# Patient Record
Sex: MALE | Race: WHITE | Employment: UNEMPLOYED | URBAN - METROPOLITAN AREA
[De-identification: names, ages, dates, MRNs, and addresses within clinical notes are randomized per-mention and may not be internally consistent; named-entity substitution may affect disease eponyms.]

---

## 2018-01-08 ENCOUNTER — HOSPITAL ENCOUNTER (OUTPATIENT)
Dept: BEHAVIORAL HEALTH | Facility: CLINIC | Age: 38
Discharge: HOME | End: 2018-01-08
Attending: SOCIAL WORKER | Admitting: SOCIAL WORKER
Payer: COMMERCIAL

## 2018-01-08 VITALS
WEIGHT: 242 LBS | HEIGHT: 72 IN | DIASTOLIC BLOOD PRESSURE: 95 MMHG | SYSTOLIC BLOOD PRESSURE: 137 MMHG | HEART RATE: 95 BPM | BODY MASS INDEX: 32.78 KG/M2

## 2018-01-08 PROCEDURE — H0001 ALCOHOL AND/OR DRUG ASSESS: HCPCS

## 2018-01-08 ASSESSMENT — ANXIETY QUESTIONNAIRES
4. TROUBLE RELAXING: MORE THAN HALF THE DAYS
3. WORRYING TOO MUCH ABOUT DIFFERENT THINGS: MORE THAN HALF THE DAYS
5. BEING SO RESTLESS THAT IT IS HARD TO SIT STILL: SEVERAL DAYS
1. FEELING NERVOUS, ANXIOUS, OR ON EDGE: MORE THAN HALF THE DAYS
GAD7 TOTAL SCORE: 11
6. BECOMING EASILY ANNOYED OR IRRITABLE: SEVERAL DAYS
7. FEELING AFRAID AS IF SOMETHING AWFUL MIGHT HAPPEN: SEVERAL DAYS
2. NOT BEING ABLE TO STOP OR CONTROL WORRYING: MORE THAN HALF THE DAYS

## 2018-01-08 ASSESSMENT — PAIN SCALES - GENERAL: PAINLEVEL: MILD PAIN (2)

## 2018-01-08 ASSESSMENT — PATIENT HEALTH QUESTIONNAIRE - PHQ9: SUM OF ALL RESPONSES TO PHQ QUESTIONS 1-9: 17

## 2018-01-08 NOTE — PROGRESS NOTES
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 make changes? What does the person feel would be helpful? What has the person been able to do without help?)      Pt appears to be totally self-motivated for sobriety and tx at this time, and denies an external motivating factors. He rates his motivation for change as 10 on a 10 point scale.        DIMENSION V - Relapse, Continued Use and Continued Problem Potential     1. If you have had previous periods of sobriety, when was your longest period of sobriety and what were you doing at that time that was supporting your sobriety?  (DSM-5: Sx #2)     -  He just quit cold turkey.     2. Within the past 30 days, on a scale from 0-10 (0 = having no cravings at all and 10 = having very strong cravings to use alcohol or other drugs) what number would you assign to your cravings? (DSM-5: Sx #4)     7    3. Can you identify any specific reasons or specific triggers that contribute to you being more likely to consume alcohol or other drugs? (DSM-5: Sx #4)    Yes, explain: He doesn't have energy,     4. Have you been treated for alcohol/other substance use disorder? (DSM-5: Sx #2)    Yes     Vicky Walker, he didn't like it, he just ready  to quit. He used immediately.    5. Support group participation: Have you/do you attend 12-step or other support group meetings? How recently? What was your experience? Are you willing to restart? If the person has not participated, is he or she willing?  (DSM-5: Sx #2)    The patient reported a remote history of attending 12-step or other support group meetings on a regular basis, but denied having any recent attendance at meetings. Last use was about a year ago.     6. Do you drink alcohol or use other drugs in larger amounts than intended or over a longer period of time than was intended?  (DSM-5: Sx #1)    Yes, explain: he said he was aware it was a problem in the past an quit form  - . After starting again, he realized it was a problem last year but just not able to quit.     7. Do you spend a great deal of time engaged in activities necessary to obtain alcohol or other drugs, a great deal of time using alcohol or other drugs, or a great deal of time recovering from alcohol or other drug use?  (DSM-5: Sx #3)    Yes, explain: he snorts 5 lines which affect him all day long,     Dimension V Ratings Summary   Relapse/Continued Use/Continued problem potential - The placing authority must use the criteria in Dimension V to determine a client s relapse, continued use, and continued problem potential.   RISK DESCRIPTIONS - Severity ratin No awareness of the negative impact of mental health problems or substance abuse. No coping skills to arrest mental health or addiction illnesses, or prevent relapse.    REASONS SEVERITY WAS ASSIGNED - (What information did the person provide that indicates his or her understanding of relapse issues? What about the person s experience indicates how prone he or she is to relapse? What coping skills does the person have that decrease relapse potential?)      Pt has been a heavy daily user of heroin for several years and unable to quit in spite of negative consequences in  several life areas. He has only had one CD tx and lacks relapse prevention recovery management skills. He has been an almost daily user for a long time and has been habituated to using so is at high risk of relapse. The sudden death of his fiance and best friend have been a shocking to him and increase his risk of relapse. He rates his cravings of 7 on a 10 point scale.        DIMENSION VI - Recovery Environment     1. Are you employed or attending school?    Neither. 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     2. If working or a student, are you able to function appropriately in that setting?     Yes    3. Has your job and/or school work been negatively impacted by your use of alcohol of other drugs?  (DSM-5: Sx #5 & Sx #7)    No    4. How would you describe your current finances?  Doing okay     5. Are you having financial problems, such as money being tight, living paycheck to paycheck, having unpaid or late bills, having significant debt, a history of bankruptcy, or IRS problems?    No     6. Describe a typical day; evening for you. Work, school, social, leisure activities, volunteer, exercise, spiritual practices or other daily tasks.    His pattern for last 3 years was to get up at 6 am use 1/2 oz left from the day before as he typically bought an oz a day.  He would work 8 - 7 pm 7 days a week, many of his customers would stop by to purchase drugs in the AM. After work he would meet with dealer, who might stop by at work, to get supply for next day.     7. Have you reduced or discontinued recreational activities, hobbies or other leisure activities as a result of your use of alcohol or other drugs?  (DSM-5: Sx #7)    No  He still occasionally plays golf.    8. Who do you live with?      He had been living with his fiance and BF. He is temporarily staying with his mother.     9.  Are there any people in the home who have current substance abuse issues or have mental health issues?     No  Mom doesn't use. His fiance and BF were also addicted to heroin.     10. Tell me about your living environment/neighborhood? Ask enough follow up questions to determine safety, criminal activity, availability of alcohol and drugs, supportive or antagonistic to the person making changes.      Safe, drugs not readily available. Drug dealer would meet him anywhere.     11. Are you concerned for your safety or anyone else's safety in the home? No    12. Do you have plans to move somewhere else or change your living environment in any manner?    He has no idea where he will move after tx.     13. Do you have children who live with you?     No    14. Do you have children who do not live with you?     No    15. Do you have any history of being involved with Child Protection Services? No     16. Are you currently in a significant relationship?     He was but his fiance of 7 years  yesterday.     17. How do you identify your sexual orientation?    Heterosexual    18. The patient reported he denies any mental health hospitalizations.    19. Does your significant other have a history of substance abuse or have current substance abuse issues?    Yes, If yes explain: His fiance and best friend were also daily heroin users.      20. How important is substance use to your social connections? Do many of your family or friends use?     90 % of his using was done alone.     21. Who in your life would you consider to be your primary support network at this time?    Family, parents as well as step mom and step dad.     22. Have any of your relationships (S.O., family members, friends, employers, teachers, etc.) been negatively impacted by your use of alcohol or other drugs?  (DSM-5: Sx #6)    Yes, If yes explain: They have been concerned about it for 7 years. He isolates from them, cut everyone off.     23. Do you  currently participate in community randell activities, such as attending Voodoo, temple, Jain or Baptist services?  No    24. Criminal justice history: Gather current/recent history and any significant history related to substance use--Arrests? Convictions? Circumstances? Alcohol or drug involvement? Sentences? Still on probation or parole? Expectations of the court? Current court order?  (DSM-5: Sx #8)    None  Possibly coming, Police want to talk to him about his fiance's and BF's OD.    25. Are you or have you ever been a registered sex offender? No    26. Do you have a child protection worker,  or ? No    27. Do you have a valid 's license?  Yes    28. What obstacles exist to participating in treatment? (Time off work, childcare, funding, transportation, pending [facility] time, living situation)     None, he wants to get in ASAP.    Dimension VI Ratings Summary   Recovery environment - The placing authority must use the criteria in Dimension VI to determine a client s recovery environment.   RISK DESCRIPTIONS - Severity rating: 3 Client is not engaged in structured, meaningful activity and the client s peers, family, significant other, and living environment are unsupportive, or there is significant criminal justice system involvement.    REASONS SEVERITY WAS ASSIGNED - (What support does the person have for making changes? What structure/stability does the person have in his or her daily life that will increase the likelihood that changes can be sustained? What problems exist in the person s environment that will jeopardize getting/staying clean and sober?)     Pt is currently living with his mother who is very supportive. He is not allowed to go back to his apartment until the police complete the investigation of his fiance and friend's death. A lot of his associates are drug users, dealers and customers. His work is a place of using in that a lot of his customers and dealer  meet him there. He has very little chemically free carlos activities out side of work. He has not engaged in any Alevism or sober support groups. 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 He is not working now, so has a lot of unstructured free time which can be a set up for using.         Mental Health Status   Physical Appearance/Attire: Appears stated age   Hygiene: Comment: fair grooming   Eye Contact: at examiner   Speech Rate:  regular   Speech Volume: regular   Speech Quality: fluid   Cognitive/Perceptual:  reality based   Cognition: memory intact    Judgment: intact   Insight: intact   Orientation:  time, place, person and situation   Thought:   logical    Hallucinations:  none   General Behavioral Tone: cooperative   Psychomotor Activity: no problem noted   Gait:  no problem   Mood: appropriate, sad and depressed   Affect: flat/none and blunted/restricted   Counselor Notes: NA     Patient Choices/Exceptions     Would you like services specific to language, age, gender, culture, Confucianism preference, race, ethnicity, sexual orientation or disability?  No    What particular treatment choices and options would you like to have? Residential tx with the first opening he does not want a MAT program.     Do you have a preference for a particular treatment program? Residential tx with first opening he does not want a MA^T program.     Patient is willing to follow treatment recommendations.  Yes    DSM-5 Criteria for Substance Use Disorder   Criteria for Diagnosis  Instructions: Determine whether the client currently meets the criteria for Substance Use Disorder using the diagnostic criteria in the DSM-5 pp.[id number]. Current means during the most recent 12 months outside a facility that controls access to substances.    A problematic pattern of alcohol/drug use leading to clinically significant impairment or distress, as manifested by at least two of the following, occurring within a 12-month period:    1.  Alcohol/drug is often taken in larger amounts or over a longer period than was intended.  2. There is a persistent desire or unsuccessful efforts to cut down or control alcohol/drug use  3. A great deal of time is spent in activities necessary to obtain alcohol/drug, use alcohol/drug, or recover from its effects.  4. Craving, or a strong desire or urge to use alcohol/drug  5. Recurrent alcohol/drug use resulting in a failure to fulfill major role obligations at work, school or home.  6. Continued alcohol use despite having persistent or recurrent social or interpersonal problems caused or exacerbated by the effects of alcohol/drug.  7. Important social, occupational, or recreational activities are given up or reduced because of alcohol/drug use.  8. Recurrent alcohol/drug use in situations in which it is physically hazardous.  9. Alcohol/drug use is continued despite knowledge of having a persistent or recurrent physical or psychological problem that is likely to have been caused or exacerbated by alcohol.  10. Tolerance, as defined by either of the following: A need for markedly increased amounts of alcohol/drug to achieve intoxication or desired effect.  11. Withdrawal, as manifested by either of the following: The characteristic withdrawal syndrome for alcohol/drug (refer to Criteria A and B of the criteria set for alcohol/drug withdrawal).          Specify if: In early remission:  After full criteria for alcohol/drug use disorder were previously met, none of the criteria for alcohol/drug use disorder have been met for at least 3 months but for less than 12 months (with the exception that Criterion A4,  Craving or a strong desire or urge to use alcohol/drug  may be met).     In sustained remission:   After full criteria for alcohol use disorder were previously met, non of the criteria for alcohol/drug use disorder have been met at any time during a period of 12 months or longer (with the exception that Criterion  A4,  Craving or strong desire or urge to use alcohol/drug  may be met).   Specify if:   This additional specifier is used if the individual is in an environment where access to alcohol is restricted.    Mild: Presence of 2-3 symptoms  Moderate: Presence of 4-5 symptoms  Severe: Presence of 6 or more symptoms    DSM-5 Substance Use Disorder Diagnosis     Alcohol Use Disorder Moderate - 303.90 (F10.20) in full remission  Opioid Use Disorder Severe - 304.00 (F11.20)  Cocaine Use Disorder Mild - 305.60 (F14.10) in full remission  Tobacco Use Disorder Moderate - 305.10 (F17.200)    Collateral Contact Summary     Number of contacts made:  1    Contact with referring person:  Yes, If yes explain: self    If court related records were reviewed, summarize here:  No    Information from collateral contacts supported/largely agreed with information from the client and associated risk ratings.    Collateral Contact      Name: Relationship: Phone number: Releases:   Julienne Reveles mother [phone] Yes     His mother said she has been concerned for a long time. He went to [facility] in 2012. She said she doesn't know much about his use because for the last several years he has isolated and withdrawn from his family.        Recommendations     1) Abstain from alcohol and all illicit drugs.  2) Enter a residential CD program and follow counselor recommendations. He is looking at [facility].   3) He could benefit from a mental health assessment.   4) Arrange for a step down level of programing after intensive residential tx.   5) Arrange for sober living after tx.   6) Patient will need to work grief and loss issues regarding death of fiance and best friend.  7) Patient may need to look at a different job as his previous job was intertwined with his using customers and dealer.     Level of Care   I.) Intoxication and Withdrawal: 1   II.) Biomedical:  1   III.) Emotional and Behavioral:  2   IV.) Readiness to Change:   0   V.) Relapse Potential: 4   VI.) Recovery Environmental: 3     Initial Problem List     The patient lacks relapse prevention skills  The patient has poor coping skills  The patient has poor refusal skills   The patient lacks a sober peer support network  The patient has a tendency to isolate  The patient lacks the ability to effectively manage his/her mental health issues  The patient has a significant history of grief and loss issues  The patient is still in shock from the recent death of his fiance and best friend.
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
## 2018-01-09 ASSESSMENT — ANXIETY QUESTIONNAIRES: GAD7 TOTAL SCORE: 11
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
## 2018-01-12 ENCOUNTER — HOSPITAL ENCOUNTER (OUTPATIENT)
Dept: BEHAVIORAL HEALTH | Facility: CLINIC | Age: 38
End: 2018-01-12
Attending: FAMILY MEDICINE
Payer: COMMERCIAL

## 2018-01-12 VITALS — TEMPERATURE: 97.7 F | SYSTOLIC BLOOD PRESSURE: 157 MMHG | HEART RATE: 104 BPM | DIASTOLIC BLOOD PRESSURE: 96 MMHG

## 2018-01-12 PROBLEM — F19.20 CHEMICAL DEPENDENCY (H): Status: ACTIVE | Noted: 2018-01-12

## 2018-01-12 PROCEDURE — H2035 A/D TX PROGRAM, PER HOUR: HCPCS | Mod: HQ

## 2018-01-12 PROCEDURE — 10020000 ZZH LODGING PLUS FACILITY CHARGE ADULT

## 2018-01-12 RX ORDER — LORATADINE 10 MG/1
10 TABLET ORAL DAILY PRN
COMMUNITY
End: 2018-02-03

## 2018-01-12 RX ORDER — AMOXICILLIN 250 MG
2 CAPSULE ORAL DAILY PRN
COMMUNITY
End: 2018-02-03

## 2018-01-12 RX ORDER — MAGNESIUM HYDROXIDE/ALUMINUM HYDROXICE/SIMETHICONE 120; 1200; 1200 MG/30ML; MG/30ML; MG/30ML
30 SUSPENSION ORAL EVERY 6 HOURS PRN
COMMUNITY
End: 2018-02-03

## 2018-01-12 ASSESSMENT — ANXIETY QUESTIONNAIRES
2. NOT BEING ABLE TO STOP OR CONTROL WORRYING: MORE THAN HALF THE DAYS
6. BECOMING EASILY ANNOYED OR IRRITABLE: SEVERAL DAYS
GAD7 TOTAL SCORE: 11
5. BEING SO RESTLESS THAT IT IS HARD TO SIT STILL: SEVERAL DAYS
4. TROUBLE RELAXING: MORE THAN HALF THE DAYS
3. WORRYING TOO MUCH ABOUT DIFFERENT THINGS: MORE THAN HALF THE DAYS
7. FEELING AFRAID AS IF SOMETHING AWFUL MIGHT HAPPEN: SEVERAL DAYS
1. FEELING NERVOUS, ANXIOUS, OR ON EDGE: MORE THAN HALF THE DAYS

## 2018-01-12 ASSESSMENT — PATIENT HEALTH QUESTIONNAIRE - PHQ9: SUM OF ALL RESPONSES TO PHQ QUESTIONS 1-9: 11

## 2018-01-12 NOTE — PROGRESS NOTES
"  Progress Note       This patient was seen for a face to face update of his comprehensive assessment on 1/12/2018 by MAG Pelaez:  Yes    This patient had a comprehensive assessment with MAG Ch on Monday 1/8/2018.  An update of his comprehensive assessment was completed today by MAG Pelaez on Friday 1/12/2018.  The only significant clinical change since his original comprehensive assessment on 1/8/2018 was he had 1 additional use of heroin on Wednesday 1/10/2018 when he reported snorting \"1 point\" of heroin at 4pm.  The patient participated in a UA drug screen on the day of the CD evaluation on 1/12/2018 using the [facility] 12 panel cup.  The UA drug screen came back positive for Morphine/Heroin and Oxycodone and negative for the rest of the 12 drugs on the screening panel, including Cocaine, THC, Amphetamines, Methamphetamine, PCP, Benzodiazepines, Barbiturates, Methadone, MDMA and Buprenorphine.     Date of update: Update Evaluation Counselor:   1/12/2018     MAG Pelaez     Date of original CD evaluation: Original Evaluation Counselor:   1/8/2018     MAG Ch       PHQ-9, VINCENT-7   PHQ-9 on @TD@ VINCENT-7 on @TD@   The patient's PHQ-9 score was 11 out of 27, indicating moderate depression.     The patient's VINCENT-7 score was 11 out of 21, indicating moderate anxiety.         Suicide Screening Questions:   1. Are you feeling hopeless about the present/future?    No   2. Have you ever had thoughts about taking your life?    No   3. When did you have these thoughts?    NA   4. Do you have any current intent or active desire to take your life?    No   5. Do you have a plan to take your life?    No   6. Have you ever made a suicide attempt?    No   7. Do you have access to pills, guns or other methods to kill yourself?    No             Guide to Risk Ratings   IDEATION: Active thoughts of suicide? INTENT: Intent to follow on suicide? PLAN: Plan to follow through on " "suicide? Level of Risk:   IF Yes Yes Yes Patient = High Emergent   IF Yes Yes No Patient = High Urgent/Non-Emergent   IF Yes No No Patient = Moderate Non-Urgent   IF No No No Patient = Low Risk   The patient's ADDITIONAL RISK FACTORS and lack of PROTECTIVE FACTORS may increase their overall suicide risk ratings.      Patient's Responses (within the last 30 days)   IDEATION: Active thoughts of suicide?     No    INTENT: Intent to follow on suicide?     No    PLAN: Plan to follow through on suicide?     No    Determining the level of risk depends on the patient responses, suicide risk factors and protective factors.      Additional Risk Factors: A recent death of someone close to the patient and/or unresolved grief and loss issues  A recent loss that was significant to the patient, i.e. loss of job, loss of home, divorce, break-up, etc.  Significant history of trauma and/or abuse issues  Fiance of 7 years and best friend of 10 years  last week of questionable causes I.e. Heroin OD or accidental carbon monoxide poisoning from a defective car muffler.    Protective Factors:  Having people in his/her life that would prevent the patient from considering committing suicide (i.e. young children, spouse, parents, etc.)  Having easy access to supportive family members          Risk Status   Emergent? No   Urgent / Non-Emergent? No   Present / Non- Urgent? Yes, Document in Epic / SBAR to counselor, Collaborate with patient / client to develop \"Patient Safety Plan\", Referral to PCP or psychiatrist, Address in Treatment Plan, Continuous monitoring, assessment and intervention and Address in Discharge / Transition Plan    Low Risk? See above   Additional information to support suicide risk rating: See Above     Current TAMIKO: Current UA:     .000       Positive for Morphine/Heroin and Oxycodone  and negative for all other screened drugs.         Alcohol/Drug use since the last CD evaluation (include date and time of last use): " "    He had 1 use of heroin since he met with [facility] when he reported snorting \"1 point\" of heroin at 4pm on Tuesday 1/10/2018.       Please note any other clinical changes since the last CD evaluation (such as medication changes, additional legal charges, detoxification admissions, overdoses, etc.)     No significant changes since the last CD evaluation         Current ASAM Dimensions   I.) Intoxication and Withdrawal: 1   II.) Biomedical:  1   III.) Emotional and Behavioral:  2   IV.) Readiness to Change:  0   V.) Relapse Potential: 4   VI.) Recovery Environmental: 3         "
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
## 2018-01-12 NOTE — PROGRESS NOTES
[facility] Health Assessment      Vital signs:     BP (!) 157/96  Pulse 104  Temp 97.7  F (36.5  C)      Direct admission    Counselor: Aron  Drug of Choice: Heroin  Last use: 1/10/2018  Home clinic/MD: Dr. Mary Taylor / [facility]  Psychiatrist/therapist: none    Medical history/current conditions:  none    H&P Screen:  H&P within the last 90 days: No.  Patient has been informed they are required to obtain an H&P within the next 14 days.        Mental Health diagnosis: none  Medication compliant?: na  Recent sucidal thoughts? no     When? na  Current thought of self-harm? no    Plan? na    Pain assessment:   Pt. Experiencing pain at this time?  No      Nursing Assessment Summary:  no    On-going nursing intervention required?   No    Acute care visit recommended: no

## 2018-01-12 NOTE — PROGRESS NOTES
Initial Services Plan        Before your first treatment group, please do the following    Immediate health & safety concerns: Obtain personal items (glasses, hearing aides, medicines, diabetes supplies, clothing, etc.).  Look for a support network (such as AA, NA, DBT group, a Christianity group, etc.)    Suggestions for client during the time between intake & completion of treatment plan:  Tour your treatment center (unit or outpatient clinic).  Introduce yourself to the treatment group.  Spend time getting to know your peers.  Complete the problem list for your treatment plan.  Start drug and alcohol use history.  Review your patient or client handbook.  Identify concerns about whom to ask for family week    Client issues to be addressed in the first treatment sessions:  Identify concerns about coming into treatment, i.e. fear of failing again, sharing a room in treatment  Other His fiancee and his best friend both  from a probable heroin overdose last week, so it is still very fresh with him and he would like to focus on addressing his grief and loss issues.      MAG Pelaez  2018  8:48 AM

## 2018-01-12 NOTE — PROGRESS NOTES
"Patient Safety Plan Template    Name:   Christiano Urñea  YOB: 1980 Age:  37 MR Number:  [id number]   Step 1: Warning signs (Thoughts, images, mood, situation, behavior) that a crisis may be developin. Isolate      2. Patient did not answer     3. Patient did not answer   Step 2: Internal coping strategies - Things I can do to take my mind off of my problems without contacting another person (relaxation technique, physical activity):     1.  Reading   2.   Music    3. Meditation      Step 3: People and social settings that provide distraction:     1. Name:  RIKY Ureña    Phone:   [phone]   2. Name:  Julienne Reveles  Phone:   [phone]   3. Place: [facility] 4. Place: [phone]     The one thing that is most important to me and worth living for is: \"My family\"      Step 4: People whom I can ask for help:     1. Name: RIKY Ureña  Phone: [phone]     2. Name:   Julienne Reveles  Phone:   [phone]   3. Name: Jefferson Villalba Phone: [phone]   Step 5: Professionals or agencies I can contact during a crisis:     1. Clinician Name: Patient did not answer Phone: Patient did not answe   Clinician Pager or Emergency Contact #:     2. Clinician Name: Patient did not answer Phone:   Patient did not answer   Clinician Pager or Emergency Contact #:      3. Local Urgent Care Services:     Urgent Care Services Address:     Urgent Care Services Phone:      4. Suicide Prevention Lifeline Phone: [phone] ([phone])     Step 6: Making the environment safe:     1. Stay in contact with family/structure in schedule     2. No drugs in house     Safety Plan Template 2008 Beth Owens and Hector Chandler is reprinted with the express permission of the authors.  No portion of the Safety Plan Template may be reproduced without the express, written permission.  You can contact the authors at bhs@[place].[facility] or [email or web address].                   "

## 2018-01-13 ENCOUNTER — HOSPITAL ENCOUNTER (OUTPATIENT)
Dept: BEHAVIORAL HEALTH | Facility: CLINIC | Age: 38
End: 2018-01-13
Attending: FAMILY MEDICINE
Payer: COMMERCIAL

## 2018-01-13 PROCEDURE — H2035 A/D TX PROGRAM, PER HOUR: HCPCS | Mod: HQ

## 2018-01-13 PROCEDURE — 10020000 ZZH LODGING PLUS FACILITY CHARGE ADULT

## 2018-01-13 RX ORDER — MULTIPLE VITAMINS W/ MINERALS TAB 9MG-400MCG
1 TAB ORAL DAILY
COMMUNITY
End: 2018-02-03

## 2018-01-13 ASSESSMENT — ANXIETY QUESTIONNAIRES: GAD7 TOTAL SCORE: 11

## 2018-01-13 NOTE — PROGRESS NOTES
Acknowledgement of Current Treatment Plan - Initial Treatment Plan     INITIAL TREATMENT PLAN:     1. I have participated in creating my treatment plan with my therapist / counselor on __1/13/18________.     I agree with the plan as it is written in the electronic health record.    Name Signature/Date   Christiano Ureña     Name of Therapist / Counselor Signature/Date   SEBAS [facility]      2. I have completed and reviewed my Safety Plan with my counselor and signed this on __1/13/18_______. I have been given the hard copy of this plan.    Patient signature/date:      ________________________________________________________________    3. Last Use Date: ___1/8/18_______    Patient signature/date:     ________________________________________________________________                                                    Acknowledgement of Current Treatment Plan - Additional Entries       ADDITIONAL GOALS AND INTERVENTIONS:    Signatures/dates required for any additional Problems, Goals, and/or Interventions added to treatment plan:  Change/Addition in Dimension ____ on date:_________  Insert here:         I have participated in creating this change and/or addition to my treatment plan copied above.   I have been given a copy of this signature page with change/addition to my treatment plan and I am in agreement with how it is written in the electronic record.       Patient signature:   ________________________________________________________________________    I have been given a copy of the addition to my treatment plan in Dimension _____ on _________ and I agree with this as it is written in the electronic record.      Patient signature:   ________________________________________________________________      Last use date if changed: ________________________________________________________________

## 2018-01-14 ENCOUNTER — HOSPITAL ENCOUNTER (OUTPATIENT)
Dept: BEHAVIORAL HEALTH | Facility: CLINIC | Age: 38
End: 2018-01-14
Attending: FAMILY MEDICINE
Payer: COMMERCIAL

## 2018-01-14 PROCEDURE — H2035 A/D TX PROGRAM, PER HOUR: HCPCS | Mod: HQ

## 2018-01-14 PROCEDURE — 10020000 ZZH LODGING PLUS FACILITY CHARGE ADULT

## 2018-01-15 ENCOUNTER — HOSPITAL ENCOUNTER (OUTPATIENT)
Dept: BEHAVIORAL HEALTH | Facility: CLINIC | Age: 38
End: 2018-01-15
Attending: FAMILY MEDICINE
Payer: COMMERCIAL

## 2018-01-15 ENCOUNTER — TELEPHONE (OUTPATIENT)
Dept: BEHAVIORAL HEALTH | Facility: CLINIC | Age: 38
End: 2018-01-15

## 2018-01-15 ENCOUNTER — OFFICE VISIT (OUTPATIENT)
Dept: BEHAVIORAL HEALTH | Facility: CLINIC | Age: 38
End: 2018-01-15
Payer: COMMERCIAL

## 2018-01-15 VITALS
OXYGEN SATURATION: 97 % | RESPIRATION RATE: 16 BRPM | DIASTOLIC BLOOD PRESSURE: 82 MMHG | SYSTOLIC BLOOD PRESSURE: 130 MMHG | WEIGHT: 253 LBS | TEMPERATURE: 98.5 F | BODY MASS INDEX: 34.31 KG/M2 | HEART RATE: 102 BPM

## 2018-01-15 DIAGNOSIS — Z00.00 ROUTINE GENERAL MEDICAL EXAMINATION AT A HEALTH CARE FACILITY: ICD-10-CM

## 2018-01-15 DIAGNOSIS — G47.00 PERSISTENT DISORDER OF INITIATING OR MAINTAINING SLEEP: Primary | ICD-10-CM

## 2018-01-15 PROCEDURE — 10020000 ZZH LODGING PLUS FACILITY CHARGE ADULT

## 2018-01-15 PROCEDURE — H2035 A/D TX PROGRAM, PER HOUR: HCPCS | Mod: HQ

## 2018-01-15 PROCEDURE — 99203 OFFICE O/P NEW LOW 30 MIN: CPT | Performed by: NURSE PRACTITIONER

## 2018-01-15 NOTE — NURSING NOTE
Chief Complaint   Patient presents with     Lodging Plus       Initial /82  Pulse 102  Temp 98.5  F (36.9  C) (Oral)  Resp 16  Wt 253 lb (114.8 kg)  SpO2 97%  BMI 34.31 kg/m2 Estimated body mass index is 34.31 kg/(m^2) as calculated from the following:    Height as of 1/8/18: 6' (1.829 m).    Weight as of this encounter: 253 lb (114.8 kg).  Medication Reconciliation: complete

## 2018-01-15 NOTE — PROGRESS NOTES
CC: Patient is a 37 year old  male who presents for History and Physical for Lodging Plus.     Besides history and physical:  patient would like to discuss no issues.   Insomnia      Duration: chronic for years, hoping it gets better now that he has detoxed    Description  Frequency of insomnia:  nightly  Time to fall asleep: Depends, 15min- 1 hour  Middle of night awakening:  nightly  Early morning awakening:  nightly    Accompanying signs and symptoms:  none    History  Similar episodes in past:  YES- chronic for years  Previous evaluation/sleep study:  no     Precipitating or alleviating factors:  New stressful situation: YES- detoxed from Alcohol   Caffeine intake after lunchtime: no   OTC decongestants: no   Any new medications: no     Therapies tried and outcome: recommend sleep study, patient in agreement          Patient Active Problem List   Diagnosis     Chemical dependency (H)       No past medical history on file.    No past surgical history on file.    No family history on file.    Social History     Social History     Marital status: Single     Spouse name: N/A     Number of children: N/A     Years of education: N/A     Occupational History     Not on file.     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, follows with opthalmology   ENT: NEGATIVE for ear, mouth and throat problems  R: NEGATIVE for significant cough or SOB  CV: NEGATIVE for chest pain, palpitations or peripheral edema  GI: NEGATIVE for nausea, abdominal pain,  "heartburn, or change in bowel habits  M: NEGATIVE for significant arthralgias or myalgia  N: NEGATIVE for weakness, dizziness or paresthesias  P: NEGATIVE for changes in mood or affect, chronic sleep issues, wakes up frequently at night, every 30 minutes  : negative for dysuria, hematuria, urethral discharge      OBJECTIVE:  Temp: 98.5  F (36.9  C) Temp src: Oral BP: 130/82 Pulse: 102   Resp: 16 SpO2: 97 %          Exam:  Constitutional: healthy, alert and no distress  Head: Normocephalic. No masses, lesions, tenderness or abnormalities  Neck: Neck supple. No adenopathy. Thyroid symmetric, normal size,  ENT: ENT exam normal, no neck nodes or sinus tenderness  Cardiovascular: negative\",  RRR. No murmurs, clicks gallops or rub  Respiratory: negative\", Lungs clear  Gastrointestinal: Abdomen soft, non-tender. BS normal. No masses, organomegaly  Musculoskeletal: extremities normal- no gross deformities noted, gait normal and normal muscle tone  Skin: no suspicious lesions or rashes  Neurologic: Gait normal. Reflexes normal and symmetric. Sensation grossly WNL.  Psychiatric: mentation appears normal and affect normal/bright  Hematologic/Lymphatic/Immunologic: normal ant/post cervical and supraclavicular  nodes      ASSESSMENT/PLAN:  (G47.00) Persistent disorder of initiating or maintaining sleep  (primary encounter diagnosis)  Comment: Long standing problem for patient, never evaluated  Plan: SLEEP EVALUATION & MANAGEMENT REFERRAL - [facility]  [phone] (Age 2 and up)        Recommend sleep eval, referral placed, counselor will help make appt    (Z00.00) Routine general medical examination at a health care facility  Comment: no issues  Plan: ok for treatment program    Nica Mariscal, LANEY, APRN CNP    "

## 2018-01-15 NOTE — TELEPHONE ENCOUNTER
A phone call was made as follow-up to the Family Program mailing for the week of 1/29/18. Message left or spoke in person to individuals on LOUIE.

## 2018-01-15 NOTE — MR AVS SNAPSHOT
After Visit Summary   1/15/2018    Christiano Ureña    MRN: [id number]           Patient Information     Date Of Birth          1980        Visit Information        Provider Department      1/15/2018 10:30 AM Nica Mariscal APRN [facility]        Today's Diagnoses     Persistent disorder of initiating or maintaining sleep    -  1      Care Instructions    We have made a referral for a sleep study, please have your counselor make an appt for you          Follow-ups after your visit        Additional Services     SLEEP EVALUATION & MANAGEMENT REFERRAL - [facility]  [phone] (Age 2 and up)       Please be aware that coverage of these services is subject to the terms and limitations of your health insurance plan.  Call member services at your health plan with any benefit or coverage questions.      Please bring the following to your appointment:    >>   List of current medications   >>   This referral request   >>   Any documents/labs given to you for this referral                      Your next 10 appointments already scheduled     Jan 16, 2018  9:30 AM CST   Treatment with ADULT LODGING PLUS C   [facility] ([facility])    [address] [place] [address]   [phone]            Jan 17, 2018  9:30 AM CST   Treatment with ADULT LODGING PLUS C   [facility] ([facility])    [address] [place] [address]   [phone]            Jan 18, 2018  9:30 AM CST   Treatment with ADULT LODGING PLUS C   [facility] ([facility])    [address] [place] [address]   [phone]            Jan 19, 2018  9:30 AM CST   Treatment with ADULT LODGING PLUS C   [facility]    [facility]note 2[facility] [address]   [phone]            Jan 20, 2018  9:30 AM CST   Treatment with ADULT LODGING PLUS C   [facility] ([facility])    [address] [place] [address]   [phone]            Jan 21, 2018  9:30 AM CST   Treatment with ADULT LODGING PLUS C   [facility] ([facility])    [address] [place] [address]   [phone]            Jan 22, 2018  9:30 AM CST   Treatment with ADULT LODGING PLUS C   [facility] ([facility])    [address] [place] [address]   [phone]            Jan 23, 2018  9:30 AM CST   Treatment with ADULT LODGING PLUS C   [facility] ([facility])    [address] [place] [address]   [phone]            Jan 24, 2018  9:30 AM CST   Treatment with ADULT LODGING PLUS C   [facility] ([facility])    [address] [place] [address]   [phone]            Jan 25, 2018  9:30 AM CST   Treatment with ADULT LODGING PLUS C   [facility] ([facility])    [address] [place] [address]   [phone]              Future tests that were ordered for you today     Open Future Orders        Priority Expected Expires Ordered    SLEEP EVALUATION & MANAGEMENT REFERRAL - ADULT -[facility]  [phone] (Age 2 and up) Routine  1/15/2019 1/15/2018            Who to contact     If you have questions or need follow up information about today's clinic visit or your  "schedule please contact [facility] directly at [phone].  Normal or non-critical lab and imaging results will be communicated to you by MyChart, letter or phone within 4 business days after the clinic has received the results. If you do not hear from us within 7 days, please contact the clinic through [facility] or phone. If you have a critical or abnormal lab result, we will notify you by phone as soon as possible.  Submit refill requests through [employer] or call your pharmacy and they will forward the refill request to us. Please allow 3 business days for your refill to be completed.          Additional Information About Your Visit        [facility] Information     [employer] lets you send messages to your doctor, view your test results, renew your prescriptions, schedule appointments and more. To sign up, go to [email or web address] . Click on \"Log in\" on the left side of the screen, which will take you to the Welcome page. Then click on \"Sign up Now\" on the right side of the page.     You will be asked to enter the access code listed below, as well as some personal information. Please follow the directions to create your username and password.     Your access code is: [email or web address]  Expires: 4/15/2018 11:09 AM     Your access code will  in 90 days. If you need help or a new code, please call your [facility] clinic or [phone].        Care EveryWhere ID     This is your Care EveryWhere ID. This could be used by other organizations to access your [facility] medical records  [id number]        Your Vitals Were     Pulse Temperature Respirations Pulse Oximetry BMI (Body Mass Index)       102 98.5  F (36.9  C) (Oral) 16 97% 34.31 kg/m2        Blood Pressure from Last 3 Encounters:   01/15/18 130/82    Weight from Last 3 Encounters:   01/15/18 253 lb (114.8 kg)               Primary Care Provider Fax #    Physician No Ref-Primary [phone]       No address on file        Equal Access " to Services     MERCEDES FELIX : Hadii marina Javier, waemilioda luqadaha, qaybcait kaalmaro olmos, tomas alexander. So [facility] [phone].    ATENCIÓN: Si arethala yolanda, tiene a hale disposición servicios gratuitos de asistencia lingüística. Llame al [phone].    We comply with applicable federal civil rights laws and [place] laws. We do not discriminate on the basis of race, color, national origin, age, disability, sex, sexual orientation, or gender identity.            Thank you!     Thank you for choosing [facility]  for your care. Our goal is always to provide you with excellent care. Hearing back from our patients is one way we can continue to improve our services. Please take a few minutes to complete the written survey that you may receive in the mail after your visit with us. Thank you!             Your Updated Medication List - Protect others around you: Learn how to safely use, store and throw away your medicines at www.disposemymeds.org.          This list is accurate as of: 1/15/18 11:09 AM.  Always use your most recent med list.                   Brand Name Dispense Instructions for use Diagnosis    alum & mag hydroxide-simethicone 200-200-20 MG/5ML Susp suspension    MYLANTA/MAALOX     Take 30 mLs by mouth every 6 hours as needed for indigestion        guaiFENesin 20 mg/mL Soln solution    ROBITUSSIN     Take 10 mLs by mouth every 4 hours as needed for cough        IBUPROFEN PO      Take 200-400 mg by mouth every 6 hours as needed for moderate pain        loratadine 10 MG tablet    CLARITIN     Take 10 mg by mouth daily as needed for allergies        MELATONIN PO      Take 3 mg by mouth nightly as needed        Multi-vitamin Tabs tablet      Take 1 tablet by mouth daily        phenol-menthol 14.5 MG lozenge      Place 1 lozenge inside cheek every 2 hours as needed for moderate pain        senna-docusate 8.6-50 MG per tablet     SENOKOT-S;PERICOLACE     Take 2 tablets by mouth daily as needed for constipation        TYLENOL PO      Take 325-650 mg by mouth every 4 hours as needed for mild pain or fever

## 2018-01-15 NOTE — PATIENT INSTRUCTIONS
We have made a referral for a sleep study, please have your counselor make an appt for you  
Daniel Kerr(Attending)

## 2018-01-16 ENCOUNTER — HOSPITAL ENCOUNTER (OUTPATIENT)
Dept: BEHAVIORAL HEALTH | Facility: CLINIC | Age: 38
End: 2018-01-16
Attending: FAMILY MEDICINE
Payer: COMMERCIAL

## 2018-01-16 PROCEDURE — 10020000 ZZH LODGING PLUS FACILITY CHARGE ADULT

## 2018-01-16 PROCEDURE — H2035 A/D TX PROGRAM, PER HOUR: HCPCS | Mod: HQ

## 2018-01-17 ENCOUNTER — HOSPITAL ENCOUNTER (OUTPATIENT)
Dept: BEHAVIORAL HEALTH | Facility: CLINIC | Age: 38
End: 2018-01-17
Attending: FAMILY MEDICINE
Payer: COMMERCIAL

## 2018-01-17 PROCEDURE — 10020000 ZZH LODGING PLUS FACILITY CHARGE ADULT

## 2018-01-17 PROCEDURE — H2035 A/D TX PROGRAM, PER HOUR: HCPCS | Mod: HQ

## 2018-01-18 ENCOUNTER — HOSPITAL ENCOUNTER (OUTPATIENT)
Dept: BEHAVIORAL HEALTH | Facility: CLINIC | Age: 38
End: 2018-01-18
Attending: FAMILY MEDICINE
Payer: COMMERCIAL

## 2018-01-18 PROCEDURE — H2035 A/D TX PROGRAM, PER HOUR: HCPCS | Mod: HQ

## 2018-01-18 PROCEDURE — 10020000 ZZH LODGING PLUS FACILITY CHARGE ADULT

## 2018-01-18 PROCEDURE — H2035 A/D TX PROGRAM, PER HOUR: HCPCS
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
## 2018-01-19 ENCOUNTER — HOSPITAL ENCOUNTER (OUTPATIENT)
Dept: BEHAVIORAL HEALTH | Facility: CLINIC | Age: 38
End: 2018-01-19
Attending: FAMILY MEDICINE
Payer: COMMERCIAL

## 2018-01-19 PROCEDURE — H2035 A/D TX PROGRAM, PER HOUR: HCPCS | Mod: HQ

## 2018-01-19 PROCEDURE — 10020000 ZZH LODGING PLUS FACILITY CHARGE ADULT

## 2018-01-19 NOTE — PROGRESS NOTES
Patient:  Christiano Ureña            Adult CD Progress Note and Treatment Plan Review     Attendance  Please refer to OP BE[facility] CD Adult Attendance Record Documentation Flowsheet    Support group attended this week: yes    Reporting sobriety:  yes    Treatment Plan     Treatment Plan Review competed on: 1/19/18       Client preferred learning style: Visual  Hands on    Staff Members contributing : Linda Watson, MAG De Los Santos, MAG , Janelle Regalado and Johnathan Sanchez    Received Supervision: No     Client: contributed to goals and plan.    Client received copy of plan/revised plan: Yes    Client agrees with plan/revised plan: Yes        Changes to Treatment Plan: No    New Goals added since last review : None     Goals worked on since last review : This is the patient's first progress note. Patient has started to address his goals of: Grief and loss, Building a health self esteem, Practicing a program of recovery/ building sober support, coping skills, spirituality      Strategies effective: yes    Strategies need these changes: No     1) Care Coordination Activities:  None needed   2) Medical, Mental Health and other appointments the client attended: Patient was seen at the [facility] on 1/15/18 for a physical. Patient also had a one to one therapy appointment on 1/18/18 with NOY Aguirre.   3) Medication issues: None reported   4) Physical and mental health problems: None reported   5) Any changes in Vulnerable Adult Status?  No    If yes, add to treatment plan and individual abuse prevention plan.  6) Review and evaluation of the individual abuse prevention plan: None needed           ASAM Risk Rating:    Dimension 1:0:  Patient reports his drug of choice is Heroin.  He reports his last date of use as 1/8/18 of Heroin at 4:00 PM in which he snorted. Patient reports that prior to 1/8/18, he used on  12/31/17 until 5:00 AM of 40 grams of Heroin. He denies any symptoms of withdrawal.    Dimension 2 :  "1: Patient denies any new medical conditions or complications. He reports that his condition with his back from a past injury will not interfere with his treatment. Patient is able to access medical services as needed.     Dimension 3 : 2: Patient denies a mental health diagnosis, however, reported symptoms of depression upon admission to the [facility] due to the recent loss of his finance and best friend. Patient shares that he has notice a change in his mood this past week and that he has \" higher energy\"  levels. Patient shares an increase in his levels of stress this week over legal issues.  Patient met with therapist, Trina VILLAFUERTE for one to one therapy as he is beginning to address his grief and loss. Patient also is working towards his goal of recognizing negative thought patterns and beginning to change into positive, self-enhancing self-talk. Patient was able to demonstrate turning his negative self talk into positive through completing his assignment, \"The Book of Me\" . Patient shared this assignment in group therapy and received favorable feedback. He shared some of his strengths as \" I can deal with just about anything\" and \" an army of people around me\" for support. 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 Patients suicide risk rating during his CD assessment was assessed as \"no/low\" risk identified.  Patient denies any past or current suicide and/or self harm ideation at this time.  Patient did collaborate with counseling staff and developed a safety plan. Patient will continue to be monitored while in the [facility] program.      Dimension 4 : 0: Patient shares his motivation for treatment and remaining sober this week is \" my ex finance\" Patient has been an active participant in all programming and group therapy and completed a collage assignment as a visual tool showing " "what his life would look like in five years of sobriety and five more years of using. Patient shared this in group therapy.   He appears in the contemplation stages of change.     Dimension 5 : 4: Patient reports his cravings to use a \"1\" on a scale of \"1 (low)-10(high). Patient is beginning to gain insight into his triggers and coping skills and attended spirituality group led by janet Wilson and supervised by Janelle HATHAWAY. Patient reports recent loss and shares limited coping skills and appears at risk for relapse.     Dimension 6 : 3: Patient reports that he is employed and currently is staying with his mother. Patient reports that he is open to aftercare recommendations of Outpatient programming, however, declines sober housing as he is wanting to go home and  live with his mother and return to his job. Referrals are being considered at this time. Patient has started working on his goal of practicing a program of recovery as he attended support meetings this past week ( AA/NA) and an Opiate Support group. Patient is also spending free time with his male peers. Patient also is working towards repairing damaged relationships due to his use. Patient has filled out releases of information for his family to attend family programming the week of 1/29/18. Patient also attended a weekend workshop on building healthy relationships when he gained insight into communication skills, healthy boundaries and Co-dependency.       Guide to Risk Ratings for Suicidality:   IDEATION: Active thoughts of suicide? INTENT: Intent to follow on suicide? PLAN: Plan to follow through on suicide? Level of Risk:   IF Yes Yes Yes Patient = High Emergent   IF Yes Yes No Patient = High Urgent/Non-Emergent   IF Yes No No Patient = Moderate Non-Urgent   IF No No   No Patient = Low Risk   The patient's ADDITIONAL RISK FACTORS and lack of PROTECTIVE FACTORS may increase their overall suicide risk ratings.     Patient's/client's " current risk rating:  Low Risk    Family Involvement:   LOUIE signed    Data:   offered feedback good insight client did actively participate.   Patient participated in the following lectures this past week and appears to have gained insight into his addicition : Monday: Cole VILLALOBOS: speaker, Nate infante, Jorge KILPATRICK: Speaker .Tuesday: Alida Addison: Disease of Addiction, Joe Libman: Hope, Karthikeyan Salinas: Sobriety . Wednesday: Trina Palomares: Open topic, [place] [facility], Eran Butler Embracing Opportunity . Thursday: Pablo Leos : Review of Steps 1-3, Dr. Kilgore: Addiction and the brain, Educational video, Friday: Dr. Mara Issa: Diet and lifestyle, Yusef CAMPBELL Speaker, Camron SIMNOS: Jadyn      Intervention:   Aftercare planning  Cognitive Behavioral Therapy  Counselor feedback  Education  Emotional management  Group feedback  Motivational Enhancement Therapy  Relapse prevention  Twelve Step facilitation  Client & counselor reviewed and signed ISP & assessment summary  Mental health education      Assessment:   Stages of Change Model  Contemplation    Appears/Sounds:  Cooperative  Motivated  Engaged  Depressed      Plan:  Focus on recovery environment  Monitor emotional/physical health  Continue to work on treatment plan goals.     Linda Watson [facility]

## 2018-01-20 ENCOUNTER — HOSPITAL ENCOUNTER (OUTPATIENT)
Dept: BEHAVIORAL HEALTH | Facility: CLINIC | Age: 38
End: 2018-01-20
Attending: FAMILY MEDICINE
Payer: COMMERCIAL

## 2018-01-20 PROCEDURE — 10020000 ZZH LODGING PLUS FACILITY CHARGE ADULT

## 2018-01-20 PROCEDURE — H2035 A/D TX PROGRAM, PER HOUR: HCPCS | Mod: HQ

## 2018-01-21 ENCOUNTER — HOSPITAL ENCOUNTER (OUTPATIENT)
Dept: BEHAVIORAL HEALTH | Facility: CLINIC | Age: 38
End: 2018-01-21
Attending: FAMILY MEDICINE
Payer: COMMERCIAL

## 2018-01-21 PROCEDURE — 10020000 ZZH LODGING PLUS FACILITY CHARGE ADULT

## 2018-01-21 PROCEDURE — H2035 A/D TX PROGRAM, PER HOUR: HCPCS | Mod: HQ

## 2018-01-22 ENCOUNTER — HOSPITAL ENCOUNTER (OUTPATIENT)
Dept: BEHAVIORAL HEALTH | Facility: CLINIC | Age: 38
End: 2018-01-22
Attending: FAMILY MEDICINE
Payer: COMMERCIAL

## 2018-01-22 PROCEDURE — H2035 A/D TX PROGRAM, PER HOUR: HCPCS | Mod: HQ

## 2018-01-22 PROCEDURE — 10020000 ZZH LODGING PLUS FACILITY CHARGE ADULT

## 2018-01-23 ENCOUNTER — HOSPITAL ENCOUNTER (OUTPATIENT)
Dept: BEHAVIORAL HEALTH | Facility: CLINIC | Age: 38
End: 2018-01-23
Attending: FAMILY MEDICINE
Payer: COMMERCIAL

## 2018-01-23 PROCEDURE — H2035 A/D TX PROGRAM, PER HOUR: HCPCS | Mod: HQ

## 2018-01-23 PROCEDURE — 10020000 ZZH LODGING PLUS FACILITY CHARGE ADULT

## 2018-01-23 NOTE — PROGRESS NOTES
"1/23/18  Patient presented assignment \"Working Through a Loss\" during group on 1/22/18. Counselor facilitated group therapy and peers provided supportive feedback. Patient discussed the recent loss of his fiance and best friend, and stated he's experienced a \"rollercoaster of emotions\" since entering treatment. Patient verbalized a desire to maintain long-term sobriety in honor of his girlfriend. Patient was encouraged to seek continued support with a therapist and attend grief group. Patient presented a picture of his fiance and shared in group. Patient to continue working on treatment plan goals and assignments.     MAG Alba  "

## 2018-01-24 ENCOUNTER — HOSPITAL ENCOUNTER (OUTPATIENT)
Dept: BEHAVIORAL HEALTH | Facility: CLINIC | Age: 38
End: 2018-01-24
Attending: FAMILY MEDICINE
Payer: COMMERCIAL

## 2018-01-24 PROCEDURE — H2035 A/D TX PROGRAM, PER HOUR: HCPCS | Mod: HQ

## 2018-01-24 PROCEDURE — 10020000 ZZH LODGING PLUS FACILITY CHARGE ADULT

## 2018-01-25 ENCOUNTER — HOSPITAL ENCOUNTER (OUTPATIENT)
Dept: BEHAVIORAL HEALTH | Facility: CLINIC | Age: 38
End: 2018-01-25
Attending: FAMILY MEDICINE
Payer: COMMERCIAL

## 2018-01-25 PROCEDURE — H2035 A/D TX PROGRAM, PER HOUR: HCPCS | Mod: HQ

## 2018-01-25 PROCEDURE — H2035 A/D TX PROGRAM, PER HOUR: HCPCS

## 2018-01-25 PROCEDURE — 10020000 ZZH LODGING PLUS FACILITY CHARGE ADULT

## 2018-01-25 NOTE — PROGRESS NOTES
"INDIVIDUAL SESSION SUMMARY    D) Met with client on 1/25/18 from 2:30-3:20. Client reported that he continues to have interrupted sleep but is waking approx 10 times a night compared to 30 times when he first entered treatment. Client stated that he was confident his sleep issue would sort itself out with more sober time. Client reported feeling \"guilty for feeling ok\" and reported that he has been ruminating on why he is ok and wondering if \"something is wrong\" with him. Therapist normalized client's experience of grief and asked client to appreciate the \"ok\" days knowing that it will help him build reserves for the hard days in the future. Client spoke of feeling \"grateful\" when he had 18-19 people including friends from childhood came to visit him on the weekend. Client reported feeling some stress related to unknown legal issues that may come from his ab's death. Therapist redirected client to speak about what is \"known\" and client spoke about having the support of family and friends, a supportive , a job he likes, a safe place to live and an  he can trust. Client spoke about the relationship he has with his fiance's mom and how her support has been unwavering. Client and therapist spoke about how the client was in denial about his use and his fiance's use because of the lack of consequences; how he \"justified\" his use and his fiance's use. Client spoke about how his denial prevented him from telling his fifide's mom that he was worried about his fiance use.    I) Individual session with client. Provided client with verbal interventions including: validation and self-compassion.   A)  Client appears to be allowing himself to process and work through his feelings of grief in a healthy way; by talking with others and seeking support through therapy. Client appears to be struggling with how he thinks people grieve and guilt for feeling okay. Client appears to be finding support from " multiple family members and in his randell.   P) Next session is scheduled for 2/1/18.   Trina Palomares, NOY  1/25/2018

## 2018-01-26 ENCOUNTER — HOSPITAL ENCOUNTER (OUTPATIENT)
Dept: BEHAVIORAL HEALTH | Facility: CLINIC | Age: 38
End: 2018-01-26
Attending: FAMILY MEDICINE
Payer: COMMERCIAL

## 2018-01-26 PROCEDURE — H2035 A/D TX PROGRAM, PER HOUR: HCPCS | Mod: HQ

## 2018-01-26 PROCEDURE — 10020000 ZZH LODGING PLUS FACILITY CHARGE ADULT

## 2018-01-26 NOTE — PROGRESS NOTES
Patient:  Christiano Ureña            Adult CD Progress Note and Treatment Plan Review     Attendance  Please refer to OP [facility] CD Adult Attendance Record Documentation Flowsheet    Support group attended this week: yes    Reporting sobriety:  yes    Treatment Plan     Treatment Plan Review competed on: 1/26/18       Client preferred learning style: Visual  Hands on    Staff Members contributing : Linda Watson [facility] , Lorri Carrero [facility] and Johnathan Sanchez [facility]    Received Supervision: No     Client: contributed to goals and plan.    Client received copy of plan/revised plan: Yes    Client agrees with plan/revised plan: Yes        Changes to Treatment Plan: No    New Goals added since last review : None     Goals worked on since last review :  Grief and loss, Building a health self esteem, Practicing a program of recovery/ building sober support, coping skills, spirituality and relapse prevention      Strategies effective: yes    Strategies need these changes: No     1) Care Coordination Activities:  None needed   2) Medical, Mental Health and other appointments the client attended:  Patient had a one to one therapy appointment on 1/18/18 with NOY Aguirre.   3) Medication issues: None reported   4) Physical and mental health problems: None reported   5) Any changes in Vulnerable Adult Status?  No    If yes, add to treatment plan and individual abuse prevention plan.  6) Review and evaluation of the individual abuse prevention plan: None needed           ASAM Risk Rating:    Dimension 1:0:  Patient reports his drug of choice is Heroin.  He reports his last date of use as 1/8/18 of Heroin at 4:00 PM in which he snorted. Patient reports that prior to 1/8/18, he used on  12/31/17 until 5:00 AM of 40 grams of Heroin. He denies any symptoms of withdrawal.    Dimension 2 : 1: Patient denies any new medical conditions or complications. He reports that his condition with his back from a past  "injury will not interfere with his treatment. Patient is able to access medical services as needed.     Dimension 3 : 2: Patient continues to address his grief and loss working towards his goal of healing. Patient competed and shared his assignment  \"Working Through a Loss\" during group therapy. Patient presented a picture of his fiance and shared his experience of a \"rollercoaster of emotions\" since entering treatment. He verbalized a desire to maintain long-term sobriety in honor of his girlfriend.  Patient shares an increase in his levels of stress this week over having to deal with his fiances mother wanting to give him his  fiances  ashes.   Patient met with therapist, Trina VILLAFUERTE for one to one therapy to address his grief and loss and has an other appointment scheduled on 18.  Patient denies any suicide ideation or self harm ideation. Patient will continue to be monitored while in the [facility].      Dimension 4 : 0: Patient shares his motivation for treatment and remaining sober this week is \" them\"( patient is referring to his finance and best friend that passed away). Patient shares wanting to remain sober and working his program of recovery and wanting to honor his fiance  and friend. He shares what is going well this week in treatment as \"using my pain for something positive\".   Patient has been an active participant in all programming and group therapy. Patient shared his first step assignment in group and received favorable feedback from his peers and counselor.  Patient  appears in the contemplation/ moving towards preparation stages of change.     Dimension 5 : 4: Patient reports his cravings to use a \"2\" on a scale of \"1 (low)-10(high). Patient  Shared he coped by \"just thought of her\". He continues to gain insight into his triggers and coping skills as he attended a Relapse Prevention workshop. He also attended spirituality group led by janetkristie Wilson and " "supervised by Lorri Carrero [facility].  Patient reports recent loss and shares limited coping skills and appears at risk for relapse.     Dimension 6 : 3: Patient continues to work on his goal of practicing a program of recovery as he attended support meetings this past week ( AA/NA) and an Opiate Support group. Patient is also spending free time with his male peers. Referral are being considered for aftercare at this time. Patient seems to have a lot of support through family and friends. He expressed his gratitude of the many who have reached out to him and have come to visit him while in the [facility] program.       Guide to Risk Ratings for Suicidality:   IDEATION: Active thoughts of suicide? INTENT: Intent to follow on suicide? PLAN: Plan to follow through on suicide? Level of Risk:   IF Yes Yes Yes Patient = High Emergent   IF Yes Yes No Patient = High Urgent/Non-Emergent   IF Yes No No Patient = Moderate Non-Urgent   IF No No   No Patient = Low Risk   The patient's ADDITIONAL RISK FACTORS and lack of PROTECTIVE FACTORS may increase their overall suicide risk ratings.     Patient's/client's current risk rating:  Low Risk    Family Involvement:   None this week.     Data:   offered feedback good insight client did actively participate.  Patient participated in the following lectures this past week:    Monday: Cole VILLALOBOS: speaker, Nate Whatley forgpeng, Jorge KILPATRICK: Speaker.   Tuesday: Alida Addison: Disease of Addiction, Joe Libman: Hope, Karthikeyan Salinas: Sobriety.      Wednesday: Trina Palomares: Open topic, [place] [facility], Eran Butler Embracing Opportunity. Thursday: Pablo Leos : Review of Steps 1-3, Dr. Kilgore: Addiction and the brain, Educational video.     Friday: Dr. Mara Issa: Diet and lifestyle, Yusef S. Speaker, Camron SIMONS: Boundaries. Patient shares his favorite lecture/speaker was \"Cole\" who shared his story of recovery.         Intervention:   Cognitive Behavioral Therapy  Counselor " feedback  Education  Emotional management  Group feedback  Motivational Enhancement Therapy  Relapse prevention  Twelve Step facilitation  Mental health education  Aftercare planning      Assessment:   Stages of Change Model  Contemplation    Appears/Sounds:  Cooperative  Motivated  Engaged  Depressed      Plan:  Focus on recovery environment  Monitor emotional/physical health  Continue to work on treatment plan goals.     MAG Purdy

## 2018-01-27 ENCOUNTER — HOSPITAL ENCOUNTER (OUTPATIENT)
Dept: BEHAVIORAL HEALTH | Facility: CLINIC | Age: 38
End: 2018-01-27
Attending: FAMILY MEDICINE
Payer: COMMERCIAL

## 2018-01-27 PROCEDURE — 10020000 ZZH LODGING PLUS FACILITY CHARGE ADULT

## 2018-01-27 PROCEDURE — H2035 A/D TX PROGRAM, PER HOUR: HCPCS | Mod: HQ

## 2018-01-28 ENCOUNTER — HOSPITAL ENCOUNTER (OUTPATIENT)
Dept: BEHAVIORAL HEALTH | Facility: CLINIC | Age: 38
End: 2018-01-28
Attending: FAMILY MEDICINE
Payer: COMMERCIAL

## 2018-01-28 PROCEDURE — H2035 A/D TX PROGRAM, PER HOUR: HCPCS | Mod: HQ

## 2018-01-28 PROCEDURE — 10020000 ZZH LODGING PLUS FACILITY CHARGE ADULT

## 2018-01-29 ENCOUNTER — HOSPITAL ENCOUNTER (OUTPATIENT)
Dept: BEHAVIORAL HEALTH | Facility: CLINIC | Age: 38
End: 2018-01-29
Attending: FAMILY MEDICINE
Payer: COMMERCIAL

## 2018-01-29 PROCEDURE — H2035 A/D TX PROGRAM, PER HOUR: HCPCS | Mod: HQ

## 2018-01-29 PROCEDURE — 10020000 ZZH LODGING PLUS FACILITY CHARGE ADULT

## 2018-01-30 ENCOUNTER — HOSPITAL ENCOUNTER (OUTPATIENT)
Dept: BEHAVIORAL HEALTH | Facility: CLINIC | Age: 38
End: 2018-01-30
Attending: FAMILY MEDICINE
Payer: COMMERCIAL

## 2018-01-30 PROCEDURE — H2035 A/D TX PROGRAM, PER HOUR: HCPCS | Mod: HQ

## 2018-01-30 PROCEDURE — 10020000 ZZH LODGING PLUS FACILITY CHARGE ADULT

## 2018-01-31 ENCOUNTER — HOSPITAL ENCOUNTER (OUTPATIENT)
Dept: BEHAVIORAL HEALTH | Facility: CLINIC | Age: 38
End: 2018-01-31
Attending: FAMILY MEDICINE
Payer: COMMERCIAL

## 2018-01-31 PROCEDURE — H2035 A/D TX PROGRAM, PER HOUR: HCPCS | Mod: HQ

## 2018-01-31 PROCEDURE — 10020000 ZZH LODGING PLUS FACILITY CHARGE ADULT

## 2018-02-01 ENCOUNTER — HOSPITAL ENCOUNTER (OUTPATIENT)
Dept: BEHAVIORAL HEALTH | Facility: CLINIC | Age: 38
End: 2018-02-01
Attending: FAMILY MEDICINE
Payer: COMMERCIAL

## 2018-02-01 PROCEDURE — H2035 A/D TX PROGRAM, PER HOUR: HCPCS | Mod: HQ

## 2018-02-01 PROCEDURE — H2035 A/D TX PROGRAM, PER HOUR: HCPCS

## 2018-02-01 PROCEDURE — 10020000 ZZH LODGING PLUS FACILITY CHARGE ADULT

## 2018-02-01 NOTE — PROGRESS NOTES
"INDIVIDUAL SESSION SUMMARY    D) Met with client on 2/1/18 from 12:30-1:00. Client reported feeling \"very emotional\" this week as he participated in the family program. Client stated that he has a better understanding of \"his family's pain\" and that \"they still needed\" him. Client spoke about how he methodically cut himself off from his family when he was using because he wanted to \"protect them\". Client stated he was \"always worrying about ho much they know\".  Client spoke about a phone call he received earlier in the week regarding the death of his fiance and best friend. Client reported that he reached out to peers and his counselor, Cony, for support. Client reported that he became very emotional and teary when watching a movie this week that had a scene about [place]. Therapist and client discussed how there will be \"many firsts\" this year where he experiences more intense feelings of grief. Client reported that he does not need to meet next week and will be starting an aftercare program at [facility] in the next few weeks.   I) Individual session with client. Provided client with verbal interventions including: nurturing, support, and psycho-ed on grief.   A) Client appears to better understand his irrational thinking when he was using and to feel more comfortable showing vulnerability, especially with his family. Client appears to better understand his need to protect others as a need to also protect himself. Client appears to have a better understanding of his grief process and is allowing himself to have \"ok\" days with less feelings of guilt.   P) Client will contact therapist to schedule for the week of 2/12/18.   Trina Palomares, NOY  2/1/2018    "

## 2018-02-01 NOTE — ADDENDUM NOTE
Encounter addended by: Hector Traore LADC on: 2/1/2018 12:07 PM<BR>     Actions taken: Sign clinical note

## 2018-02-01 NOTE — PROGRESS NOTES
Patient:  Christiano Ureña            Adult CD Progress Note and Treatment Plan Review     Attendance  Please refer to OP BEH CD Adult Attendance Record Documentation Flowsheet    Support group attended this week: yes    Reporting sobriety:  yes    Treatment Plan     Treatment Plan Review competed on: 02/01/18    Client preferred learning style: Visual  Hands on    Staff Members contributing : Linda Watson [facility] , Janelle Regalado and Johnathan Sanchez [facility]    Received Supervision: No     Client: contributed to goals and plan.    Client received copy of plan/revised plan: Yes    Client agrees with plan/revised plan: Yes        Changes to Treatment Plan: No    New Goals added since last review : None     Goals worked on since last review :  Family programming, healthy relationships,  grief and loss, practicing a program of recovery/ building sober support, coping skills, increase motivation for recovery through active participation within the treatment process, aftercare.     Strategies effective: yes    Strategies need these changes: No     1) Care Coordination Activities:  None needed   2) Medical, Mental Health and other appointments the client attended:  Patient had a one to one therapy appointment on 2/1/18  with NOY Aguirre.   3) Medication issues: None reported   4) Physical and mental health problems: None reported   5) Any changes in Vulnerable Adult Status?  No    If yes, add to treatment plan and individual abuse prevention plan.  6) Review and evaluation of the individual abuse prevention plan: None needed           ASAM Risk Rating:    Dimension 1:0:  Patient reports his drug of choice is Heroin.  He reports his last date of use as 1/8/18 of Heroin at 4:00 PM. Patient reports that prior to 1/8/18, he used on  12/31/17 until 5:00 AM of 40 grams of Heroin which he snorted. He continues to denie any symptoms of withdrawal.    Dimension 2 : 1: Patient denies any new medical  "conditions or complications.  Patient is able to access medical services as needed.     Dimension 3 : 2: Patient shares noticing reduced stress this past week. He met with therapist, Trina Palomares and continues to address his grief and loss working towards his goal of healing .  Patient denies any suicide ideation or self harm ideation. Patient will continue to be monitored while in the [facility] program.      Dimension 4 : 0: Patient shares his motivation for treatment and remaining sober this week is \" family\".  Patient has been an active participant in all programming and group therapy. Patient addressed his treatment goal of beginning to accept the powerlessness and unmanagability that his addiction has  brought to his life. Patient accomplished this through completing and presenting two assignments in group therapy. Patient shared his consequences assignment where he  identified  behaviors and  values he violated due to his use. Patient also completed the assignment, \"How bad it got\" describing a history of his use and the insanity of his thinking during his use.  Patient  appears in the preparation stages of change.     Dimension 5 : 3: Patient denies any craving this past week. He continues to gain insight into his triggers and coping skills. Patient appears to be making progress within this dimension through demonstration of continuing to practice coping skills and applying them to his recovery.  Therefore his risk rating has been lowered from a \" 4\" to a \"3\".     Dimension 6 : 3: Patient participated in family programming this week. He shares that the program was helpful to he and his family and helping them better communicate. Patient attended a Relationships workshop over the weekend where he gained insight into healthy relationships, setting boundaries and coping skills. Patient continues to work on his goal of practicing a program of recovery as he attended support meetings this past week ( AA/NA) " "and an Opiate Support group. Patient is also spending free time with his male peers. Patient met with , Timothy Pinto to discuss aftercare options. Patient is currently scheduled to attend Outpatient programming at [facility] with Kristofer Haley beginning on Feb. 20th and will return home to live with his parents.  Patient will schedule one to one therapy with NOY Aguirre the week of Feb 12th in order to continue to address his issues and extra support while he is waiting to transition into Outpatient Programming.       Guide to Risk Ratings for Suicidality:   IDEATION: Active thoughts of suicide? INTENT: Intent to follow on suicide? PLAN: Plan to follow through on suicide? Level of Risk:   IF Yes Yes Yes Patient = High Emergent   IF Yes Yes No Patient = High Urgent/Non-Emergent   IF Yes No No Patient = Moderate Non-Urgent   IF No No   No Patient = Low Risk   The patient's ADDITIONAL RISK FACTORS and lack of PROTECTIVE FACTORS may increase their overall suicide risk ratings.     Patient's/client's current risk rating:  Low Risk    Family Involvement:   Patient attended family programming this week.     Data:    Patient participated in the following lectures this past week and offered feedback during group therapy. He stated that his favorite lecture/speaker was \"Lavelle Burrell\". His topic was on stress management.     Monday : Timothy Pinto: 4 Stages of Recovery, Dr. Johnson Villalpando Neuroscience & [place] Anxiety/Addiction, and Kirby BARBA Speaker 12 Stepping.  Tuesday: Dr. Abebe Hammond Physical Health, Buzz Stroud Violence, Chemicals & Family, and Kasia YOUNG Speaker sharing, My story.   Wednesday: Kari Chaudhary Self Esteem, Lucas Reynaga Intimacy & Addiction, and speaker Get BUNDY.   Thursday: Dr. Gallardo Medication Used in CD Treatment Including Naloxone, Dustin DUMONT  speaker, Eran Butler, Emotional management. Friday : Shiloh Ponce: Smart Recovery, Buzz Stroud, Retrain your brain, Buzz Stroud, " Dealing with anger      Intervention:   Cognitive Behavioral Therapy  Counselor feedback  Education  Emotional management  Group feedback  Motivational Enhancement Therapy  Relapse prevention  Twelve Step facilitation  Mental health education  Aftercare planning      Assessment:   Stages of Change:  Preparation     Appears/Sounds:  Cooperative  Motivated  Engaged  Depressed      Plan:  Focus on recovery environment  Monitor emotional/physical health  Continue to work on treatment plan goals.     MAG Purdy

## 2018-02-01 NOTE — PROGRESS NOTES
AFTERCARE UPDATE  2/1/18  RE: Christiano Ureña    Writer met with patient this morning, 2/1/18, to discuss his aftercare plans. Pt stated he would like to do his aftercare at [facility] in Kristofer Leals' DOP program. I also offered pt a list of DOP programs in the [facility] to look over for more options if needed. After speaking with his counselors, pt has decided to attend Kristofer Haley' DOP group starting around 2/20/18.     Timothy JACOBS/ [facility]

## 2018-02-01 NOTE — PROGRESS NOTES
"D) Pt shared detailed chemical use history and listened as family shared their feelings of fear for pt's life. Two of pt's close friends recently  from drugs.. Day 2 pt shared remorse for hurting his family. Pt said \" I didn't realize how I was affecting you. A) Pt seems to be  seeking support from family system.  Everyone understands the importance of good communication focused on sharing feelings.  P) Pt to follow counselors' recommendations.  Family to attend Phase 2/Eric .  "

## 2018-02-02 ENCOUNTER — HOSPITAL ENCOUNTER (OUTPATIENT)
Dept: BEHAVIORAL HEALTH | Facility: CLINIC | Age: 38
End: 2018-02-02
Attending: FAMILY MEDICINE
Payer: COMMERCIAL

## 2018-02-02 PROCEDURE — 10020000 ZZH LODGING PLUS FACILITY CHARGE ADULT

## 2018-02-02 PROCEDURE — H2035 A/D TX PROGRAM, PER HOUR: HCPCS | Mod: HQ

## 2018-02-02 PROCEDURE — H2035 A/D TX PROGRAM, PER HOUR: HCPCS

## 2018-02-03 ENCOUNTER — HOSPITAL ENCOUNTER (OUTPATIENT)
Dept: BEHAVIORAL HEALTH | Facility: CLINIC | Age: 38
End: 2018-02-03
Attending: FAMILY MEDICINE
Payer: COMMERCIAL

## 2018-02-03 PROCEDURE — 10020000 ZZH LODGING PLUS FACILITY CHARGE ADULT

## 2018-02-03 PROCEDURE — H2035 A/D TX PROGRAM, PER HOUR: HCPCS | Mod: HQ

## 2018-02-03 NOTE — PROGRESS NOTES
Nursing Discharge Planning Meeting    Writer completed discharge planning meeting with patient. Discharge is planned for 2/9/2018, Friday    Discussed appropriate follow up care to manage JESENIA and to re-establish PCP in community. Patient given a copy of their current medications (no medications) for reference. Questions answered and patient verbalized understanding of post-discharge follow up plan.  Patient to schedule an appointment with their PCP post discharge.    Continue to support patient in discharge planning as needed to assure appropriate continuity of care.     Tobacco Cessation  Patient participated in the nicotine replacement therapy for tobacco cessation or reduction during their treatment programming: No    The patient was provided with community resources for follow-up to continue tobacco cessation support once in the community. Also the patient was encoruaged to discuss their tobacco cessation efforts with the primary care provider.

## 2018-02-04 ENCOUNTER — HOSPITAL ENCOUNTER (OUTPATIENT)
Dept: BEHAVIORAL HEALTH | Facility: CLINIC | Age: 38
End: 2018-02-04
Attending: FAMILY MEDICINE
Payer: COMMERCIAL

## 2018-02-04 PROCEDURE — H2035 A/D TX PROGRAM, PER HOUR: HCPCS | Mod: HQ

## 2018-02-04 PROCEDURE — 10020000 ZZH LODGING PLUS FACILITY CHARGE ADULT

## 2018-02-05 ENCOUNTER — HOSPITAL ENCOUNTER (OUTPATIENT)
Dept: BEHAVIORAL HEALTH | Facility: CLINIC | Age: 38
End: 2018-02-05
Attending: FAMILY MEDICINE
Payer: COMMERCIAL

## 2018-02-05 PROCEDURE — 10020000 ZZH LODGING PLUS FACILITY CHARGE ADULT

## 2018-02-05 PROCEDURE — H2035 A/D TX PROGRAM, PER HOUR: HCPCS | Mod: HQ

## 2018-02-06 ENCOUNTER — HOSPITAL ENCOUNTER (OUTPATIENT)
Dept: BEHAVIORAL HEALTH | Facility: CLINIC | Age: 38
End: 2018-02-06
Attending: FAMILY MEDICINE
Payer: COMMERCIAL

## 2018-02-06 PROCEDURE — 10020000 ZZH LODGING PLUS FACILITY CHARGE ADULT

## 2018-02-06 PROCEDURE — H2035 A/D TX PROGRAM, PER HOUR: HCPCS | Mod: HQ

## 2018-02-06 NOTE — PROGRESS NOTES
Focus of Treatment / Discharge Recommendations  Personal Safety/ Management of Symptoms    * Follow your safety plan.  Report increased symptoms to your care team and /or go to the nearest Emergency Department.   Call crisis lines as needed:       [facility] [phone]                [facility] [phone]    [facility] [phone]               Crisis Connection [phone]    [facility] [phone]              [facility] COPE [phone]    [facility] [phone]          National Suicide Prevention [phone]    [facility] [phone]            Suicide Prevention [phone]    [facility] [phone]    Abstinence/Relapse Prevention  * Take all medicines as directed.  Carry a current list of medicines with you.  * Use coping skills: nutrition, rest, exercise, spirituality, journal, meditation, engage in activities you enjoy, seek sober support  * Do not use illicit (street) drugs, controlled substances (narcotics) or alcohol.    Develop/Improve Independent Living/Socialization Skills: Ensure living environment is conducive to recovery    Community Resources/Supports:     [facility] Alcoholics Anonymous:  [phone] ( 24 hours a day)    [place]  Alcoholics Anonymous : [phone]    Narcotics Anonymous : [address] ( [phone])    [place] Recovery Connection: Recovery resources.  [phone]    Access chats, online meetings, discussions and healthy check-in activities any day, any time, from anywhere. Participate as anonymously as you like. In order to access this resource ,  Go to: [employer]!   Click on the In recovery tab. Then click on Social Community. Click on The daily Pledge: people helping people 24/7 to join.    Discharge Planning: Maintain abstinence from all mood altering substances, attend 2+ AA/NA meetings per week, obtain and maintain regular contact with a program sponsor, enter Outpatient programming at  [place] with Kristofer Haley beginning on 2/20/18 and follow recommendations,attend  one to one therapy (in addition continue with 1.1 therapy the week of February 12th with Trina VILLAFUERTE),  maintain good physical and mental health care.  Follow up with psychiatrist / main caregiver:  Dr. Mary Taylor / [facility]:  Next visit:  TBD                                                                                                                       Follow up with your therapist: Trina VILLAFUERTE   Next visit:  Week of February 12th    Go to group therapy and / or support groups at: [facility]  and meetings within your community.     See your medical doctor about: Ongoing physical health care :   Client Signature:_______________________   Date / Time:___________  Staff Signature:________________________   Date / Time:___________

## 2018-02-07 ENCOUNTER — HOSPITAL ENCOUNTER (OUTPATIENT)
Dept: BEHAVIORAL HEALTH | Facility: CLINIC | Age: 38
End: 2018-02-07
Attending: FAMILY MEDICINE
Payer: COMMERCIAL

## 2018-02-07 PROCEDURE — 10020000 ZZH LODGING PLUS FACILITY CHARGE ADULT

## 2018-02-07 PROCEDURE — H2035 A/D TX PROGRAM, PER HOUR: HCPCS | Mod: HQ

## 2018-02-08 ENCOUNTER — HOSPITAL ENCOUNTER (OUTPATIENT)
Dept: BEHAVIORAL HEALTH | Facility: CLINIC | Age: 38
End: 2018-02-08
Attending: FAMILY MEDICINE
Payer: COMMERCIAL

## 2018-02-08 PROCEDURE — H2035 A/D TX PROGRAM, PER HOUR: HCPCS | Mod: HQ

## 2018-02-08 PROCEDURE — 10020000 ZZH LODGING PLUS FACILITY CHARGE ADULT

## 2018-02-08 NOTE — PROGRESS NOTES
"D.) Pt completed his \"Relapse Prevention\" assignment and presented in group. Pt discussed ways to self-monitor for signs of relapse, strategies he can use to prevent relapse, and resources he can use to prevent relapse.      I.) Group therapy, Counselor facilitated group discussion and conducted feedback session with peers.  A.) Pt appears to have a much better awareness of his own relapse potential, things he can do to prevent relapse, and resources available to him to prevent relapse.    P.) Continue with treatment and treatment plan objectives.  MAG Crouch  "

## 2018-02-08 NOTE — PROGRESS NOTES
AFTERCARE UPDATE   2/8/2018  RE: Christiano Galeana met with Kristofer Haley to confirm the patient's start date of 2/20/18 for the DOP [facility] program. Writer then completed and faxed the auth request to [facility] on 2/8/18. I await their response. Writer relayed this information to the patient. Epic charted.    The above actions transpired with the patient's knowledge and approval.    I am off 2/9/18 and 2/12/18    Timothy JACOBS/ MAG

## 2018-02-09 NOTE — PROGRESS NOTES
[address] [place] [address]        Christiano Ureña, 1980, was admitted for evaluation/treatment of chemical dependency at [facility].  This person took part in these program(s):    ______ The Inpatient Program   ______ The Outpatient Program   ___x___ [facility]   ______ [facility] Outpatient       Date admitted: 1/11/18  Date discharged: 2/9/18    Type of discharge:   ___x___ Satisfactory - completed evaluation / treatment   ______ Discharged without completing   ______ Behavioral discharge   ______ Transferred to another chemical dependency program   ______ Transferred to another type of service   ______ Left against medical advice (AMA) / Eloped       Comments: Maintain abstinence from all mood altering substances, attend 2+ AA/NA meetings per week, obtain and maintain regular contact with a program sponsor, enter Outpatient programming at [place] with Kristofer Haley beginning on 2/20/18 and follow recommendations,attend  one to one therapy (in addition continue with 1.1 therapy the week of February 12th with Trina VILLAFUERTE),  maintain good physical and mental health care.      Counselor: MAG Purdy                       Date: 2/9/2018             Time: 8:22 AM

## 2018-02-09 NOTE — ADDENDUM NOTE
Encounter addended by: Linda Watson LADC on: 2/9/2018 11:11 AM<BR>     Actions taken: Sign clinical note

## 2018-02-09 NOTE — ADDENDUM NOTE
Encounter addended by: Linda Watson LADC on: 2/9/2018  8:24 AM<BR>     Actions taken: Sign clinical note

## 2018-02-09 NOTE — ADDENDUM NOTE
Encounter addended by: Linda Watson LADC on: 2/9/2018 11:12 AM<BR>     Actions taken: Sign clinical note

## 2018-02-20 ENCOUNTER — BEH TREATMENT PLAN (OUTPATIENT)
Dept: BEHAVIORAL HEALTH | Facility: CLINIC | Age: 38
End: 2018-02-20

## 2018-02-20 ENCOUNTER — HOSPITAL ENCOUNTER (OUTPATIENT)
Dept: BEHAVIORAL HEALTH | Facility: CLINIC | Age: 38
End: 2018-02-20
Attending: SOCIAL WORKER
Payer: COMMERCIAL

## 2018-02-20 PROCEDURE — H2035 A/D TX PROGRAM, PER HOUR: HCPCS | Mod: HQ

## 2018-02-20 NOTE — PROGRESS NOTES
Acknowledgement of Current Treatment Plan - Initial Treatment Plan     INITIAL TREATMENT PLAN:     1. I have participated in creating my treatment plan with my therapist / counselor on __________.     I agree with the plan as it is written in the electronic health record.    Name Signature/Date   Christiano Ureña     Name of Therapist / Counselor Signature/Date   JOYCE Caballero, LMFT      2. I have completed and reviewed my Safety Plan with my counselor and signed this on _________. I have been given the hard copy of this plan.    Patient signature/date:      _____________________________________________________________________________    3. Last Use Date: __________    Patient signature/date:     _____________________________________________________________________________                                                    Acknowledgement of Current Treatment Plan - Additional Entries       ADDITIONAL GOALS AND INTERVENTIONS:    Signatures/dates required for any additional Problems, Goals, and/or Interventions added to treatment plan:  Change/Addition in Dimension ____ on date:_________  Insert here:         I have participated in creating this change and/or addition to my treatment plan copied above.   I have been given a copy of this signature page with change/addition to my treatment plan and I am in agreement with how it is written in the electronic record.       Patient signature:       ________________________________________________________________________      Counselor/Therapist signature:    ________________________________________________________________              Change in date of last use:       ________________________________________________________________        Patient signature/date (required for change in last use date):     ________________________________________________________________

## 2018-02-20 NOTE — PROGRESS NOTES
Outcome of vulnerable Adult Assessment for Outpatients:    1.  Do you have a physical, emotional or mental infirmity or dysfunction?       No    2.  Does this issue impair your ability to provide for your own care without help, including providing yourself with food, shelter, clothing, healthcare or supervision?       No      3.  Because of this issue, I need assistance to protect myself from maltreatment by others.      No    Based on the above information:    This person is not a functional Vulnerable Adult according to [place] Statute 626.5572 subdivision 21.        MAG Caballero, LMFT

## 2018-02-20 NOTE — ADDENDUM NOTE
Encounter addended by: Nica Gipson [facility] on: 2/20/2018  3:26 PM<BR>     Actions taken: Episode edited

## 2018-02-20 NOTE — PROGRESS NOTES
Patient Safety Plan Template     Name:   Christiano Ureña  YOB: 1980 Age:  37 MR Number:  [id number]   Step 1: Warning signs (Thoughts, images, mood, situation, behavior) that a crisis may be developin. Isolating    2. Lying   3. Patient did not answer   Step 2: Internal coping strategies - Things I can do to take my mind off of my problems without contacting another person (relaxation technique, physical activity):   1.  Reading   2.   Music    3.  Working out    Step 3: People and social settings that provide distraction:   1. Name:  Jillian JEFFREYGini Phone:   [phone]   2. Name:  Uday Stroud  Phone:   [phone]   3. Place:   [employer] 4. Place:   the gym      The one thing that is most important to me and worth living for is: my ex-fiancee    Step 4: People whom I can ask for help:   1. Name:  Trae aLughlin Phone:    2. Name:   Julienne Reveles  Phone:   [phone]   3. Name:   Jefferson Donavanlatonyabrittany Phone:   [phone]   Step 5: Professionals or agencies I can contact during a crisis:   1. Clinician Name:JACOB Phone:    Clinician Pager or Emergency Contact #: NA   2. Clinician Name: JACOB Phone:    Clinician Pager or Emergency Contact #: NA   3. Local Urgent Care Services:      Urgent Care Services Address:      Urgent Care Services Phone: NA     4. Suicide Prevention Lifeline Phone: [phone] ([phone])   Step 6: Making the environment safe:   1. Keep it the way I am living   2. Moving into [facility]   Safety Plan Template 2008 Beth Owens and Hector Chandler is reprinted with the express permission of the authors.  No portion of the Safety Plan Template may be reproduced without the express, written permission.  You can contact the authors at [email or web address] or [email or web address].
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
## 2018-02-21 ENCOUNTER — HOSPITAL ENCOUNTER (OUTPATIENT)
Dept: BEHAVIORAL HEALTH | Facility: CLINIC | Age: 38
End: 2018-02-21
Attending: SOCIAL WORKER
Payer: COMMERCIAL

## 2018-02-21 PROCEDURE — H2035 A/D TX PROGRAM, PER HOUR: HCPCS | Mod: HQ

## 2018-02-22 ENCOUNTER — HOSPITAL ENCOUNTER (OUTPATIENT)
Dept: BEHAVIORAL HEALTH | Facility: CLINIC | Age: 38
End: 2018-02-22
Attending: SOCIAL WORKER
Payer: COMMERCIAL

## 2018-02-22 PROCEDURE — H2035 A/D TX PROGRAM, PER HOUR: HCPCS | Mod: HQ
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
## 2018-02-27 ENCOUNTER — HOSPITAL ENCOUNTER (OUTPATIENT)
Dept: BEHAVIORAL HEALTH | Facility: CLINIC | Age: 38
End: 2018-02-27
Attending: SOCIAL WORKER
Payer: COMMERCIAL

## 2018-02-27 PROCEDURE — H2035 A/D TX PROGRAM, PER HOUR: HCPCS | Mod: HQ

## 2018-02-28 ENCOUNTER — HOSPITAL ENCOUNTER (OUTPATIENT)
Dept: BEHAVIORAL HEALTH | Facility: CLINIC | Age: 38
End: 2018-02-28
Attending: SOCIAL WORKER
Payer: COMMERCIAL

## 2018-02-28 PROCEDURE — H2035 A/D TX PROGRAM, PER HOUR: HCPCS | Mod: HQ

## 2018-03-01 ENCOUNTER — HOSPITAL ENCOUNTER (OUTPATIENT)
Dept: BEHAVIORAL HEALTH | Facility: CLINIC | Age: 38
End: 2018-03-01
Attending: SOCIAL WORKER
Payer: COMMERCIAL

## 2018-03-01 PROCEDURE — H2035 A/D TX PROGRAM, PER HOUR: HCPCS | Mod: HQ

## 2018-03-02 NOTE — ADDENDUM NOTE
Encounter addended by: Marco Antonio Haley [facility] on: 3/2/2018  1:59 PM<BR>     Actions taken: Sign clinical note
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
## 2018-03-07 ENCOUNTER — HOSPITAL ENCOUNTER (OUTPATIENT)
Dept: BEHAVIORAL HEALTH | Facility: CLINIC | Age: 38
End: 2018-03-07
Attending: SOCIAL WORKER
Payer: COMMERCIAL

## 2018-03-07 PROCEDURE — H2035 A/D TX PROGRAM, PER HOUR: HCPCS

## 2018-03-07 PROCEDURE — H2035 A/D TX PROGRAM, PER HOUR: HCPCS | Mod: HQ

## 2018-03-08 ENCOUNTER — HOSPITAL ENCOUNTER (OUTPATIENT)
Dept: BEHAVIORAL HEALTH | Facility: CLINIC | Age: 38
End: 2018-03-08
Attending: SOCIAL WORKER
Payer: COMMERCIAL

## 2018-03-08 PROCEDURE — H2035 A/D TX PROGRAM, PER HOUR: HCPCS | Mod: HQ
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
## 2018-03-13 ENCOUNTER — HOSPITAL ENCOUNTER (OUTPATIENT)
Dept: BEHAVIORAL HEALTH | Facility: CLINIC | Age: 38
End: 2018-03-13
Attending: SOCIAL WORKER
Payer: COMMERCIAL

## 2018-03-13 PROCEDURE — H2035 A/D TX PROGRAM, PER HOUR: HCPCS | Mod: HQ

## 2018-03-14 ENCOUNTER — HOSPITAL ENCOUNTER (OUTPATIENT)
Dept: BEHAVIORAL HEALTH | Facility: CLINIC | Age: 38
End: 2018-03-14
Attending: SOCIAL WORKER
Payer: COMMERCIAL

## 2018-03-14 PROCEDURE — H2035 A/D TX PROGRAM, PER HOUR: HCPCS | Mod: HQ

## 2018-03-15 ENCOUNTER — HOSPITAL ENCOUNTER (OUTPATIENT)
Dept: BEHAVIORAL HEALTH | Facility: CLINIC | Age: 38
End: 2018-03-15
Attending: SOCIAL WORKER
Payer: COMMERCIAL

## 2018-03-15 PROCEDURE — H2035 A/D TX PROGRAM, PER HOUR: HCPCS | Mod: HQ
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
## 2018-03-20 ENCOUNTER — HOSPITAL ENCOUNTER (OUTPATIENT)
Dept: BEHAVIORAL HEALTH | Facility: CLINIC | Age: 38
End: 2018-03-20
Attending: SOCIAL WORKER
Payer: COMMERCIAL

## 2018-03-20 PROCEDURE — H2035 A/D TX PROGRAM, PER HOUR: HCPCS | Mod: HQ

## 2018-03-21 ENCOUNTER — HOSPITAL ENCOUNTER (OUTPATIENT)
Dept: BEHAVIORAL HEALTH | Facility: CLINIC | Age: 38
End: 2018-03-21
Attending: SOCIAL WORKER
Payer: COMMERCIAL

## 2018-03-21 PROCEDURE — H2035 A/D TX PROGRAM, PER HOUR: HCPCS | Mod: HQ

## 2018-03-21 PROCEDURE — H2035 A/D TX PROGRAM, PER HOUR: HCPCS
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
## 2018-03-22 ENCOUNTER — HOSPITAL ENCOUNTER (OUTPATIENT)
Dept: BEHAVIORAL HEALTH | Facility: CLINIC | Age: 38
End: 2018-03-22
Attending: SOCIAL WORKER
Payer: COMMERCIAL

## 2018-03-22 PROCEDURE — H2035 A/D TX PROGRAM, PER HOUR: HCPCS | Mod: HQ

## 2018-03-22 NOTE — PROGRESS NOTES
Adult CD Treatment Plan Review/Weekly Progress Note      Treatment Plan Review completed on: 3/22/18     Attendance Dates: 3/20, 3/21, 3/22     Total Group Sessions in Phase I: 14 MONDAY TUESDAY WEDNESDAY THURSDAY FRIDAY SATURDAY SUNDAY TOTAL   Group Therapy    2 2 2          6   Speciality Groups*                          1:1                         Family Program                          [place]                            Phase II                            Absent                     Total                 6      *Specialty Groups include Mental Health Care, Assertiveness and Communication, Sobriety Maintenance Skills,   Spiritual Care, Stress Management, Relapse Prevention, Family Systems.                     Learning Style:  Hands on,  Demonstration     Staff Members contributing: MAG Caballero LMFT           Received Supervision: no     Client: contributed to goals and plan     Did Client receive a copy of treatment plan/revised plan:  not yet     Changes to Treatment Plan:  none     Client agrees with plan/revised plan:  na     Any changes in Vulnerable Adult Status:  no    New Goals added since last review: none    Goals worked on since last review: Continue to develop sober support by reaching out to others     Strategies effective: Yes    Strategies need these changes: none     1) Care Coordination Activities:  None  2) Medical, Mental Health and other appointments the client attended: individual session with his counselor  3) Medication issues: none  4) Physical and mental health problems: none  5) Review and evaluation of the individual abuse prevention plan: N/A    Substance Use Disorders:     F11.20      Opioid Use Disorder, severe     F10.20      Alcohol Use Disorder, moderate, in full remission     F14.10      Cocaine Use Disorder, mild, in full remission     F17.200    Tobacco Use Disorder, moderate       ASAM Risk Ratings and Data         DIMENSION 1: Acute Intoxication/Withdrawal  The  client's ability to cope with withdrawal symptoms and current state of intoxication         Acute Intoxication/Withdrawal - Current Risk Factor:  0     Reporting sober date of: 12/31/2017     Goals: Report to counselor any substance use not prescribed by a doctor.      Data: Client did not exhibit or report any signs or sx of intoxication or withdrawal.         DIMENSION 2:  Biomedical Conditions and Complaints  The degree to which any physical disorder would interfere with treatment for substance abuse and the client's ability to tolerate any related discomfort      Biomedical Conditions and Complaints - Current Risk Factor:  1     Data:  Client reported that he had some teeth work done and that he is feeling some pain.       DIMENSION 3:  Emotional/Behavioral/Cognitive Conditions and Complications  The degree to which any condition or complications are likely to interfere with treatment for substance abuse or with function in significant life areas and the likelihood of risk of harm to self or others.      Emotional/Behavioral - Current Risk Factor:  2     DSM-5 Diagnoses:  No diagnosable mental health issue. Client is dealing with grief and loss issues due to death of his gf and best friend     Suicide Assessment:  Emergent?  No  Urgent / Non-Emergent?  No  Present / Non- Urgent?  No.    No Current Risk?  Yes, client reported no thoughts of self harm this week.     Goals:   1. Learn how to manage the symptoms of his anxiety along with his grief issues which include unpleasant/painful thoughts, feelings and sensation in a more helpful way using the ACT tools of mindfulness, acceptance and defusion in order to move his life forward.  2. Using the weekly Check-in worksheet, report severity of any mental health issues and the tools he used to cope with them.  3. Meet with therapist on a weekly basis     Data:  Client reported no thoughts of self-harm this week. Client reported a 5/10 for difficulty falling asleep and  a 3/10 for sadness, intrusive/distressing memories and grandiosity and 2/10 for worry/rumination.  Client participated the discussions on procrastination and meaning. Client has begun regular meetings with therapist Trina Palomares (see her notes).  Progress: Client reported that his has been focusing on using forgiveness and gratitude in his life. Client was engaged in the exercises and discussions on procrastination and meaning. Client was active and engaged in the group discussions this week and looked around more and engaged in more eye contact. Client appears overall to be in a good mental state at this time. Client continues to mention and deal with his grief and loss issues and does address them every week including talking about them in group and to other group members individually.         DIMENSION 4:  Readiness to Change  Consider the amount of support and encouragement necessary to keep the client involved in treatment.      Readiness to Change - Current Risk Factor: 0     Goals:    1. Increase internal motivation to stay sober by focusing and particular values week after week as noted in weekly Check-in and Recovery Action Plan (RAP) worksheet.  2. Define Values based on what he wants his life to be about.     Data:  Client reported that he focused on his values of acceptance, humility and willingness.  Progress: Client reported that he is opening up communication with his family and meeting more people at meetings which is uncomfortable for him but necessary.          DIMENSION 5:  Relapse/Continued Use/Continued Problem Potential  Consider the degree to which the client recognizes relapse issues and has the skills to prevent relapse of either substance use or mental health problems.      Relapse/Continued Use/Continued Problem Potential - Current Risk Factor:  3     Goals:    1. Understand use and relapse patterns  2. Learn how to manage urges and cravings to use through the use of mindfulness and urge  "surfing techniques     Data:  Client rated his cravings and urges as 1/10. Client reported that the desire to use is just not there anymore.  Progress: No change here. Client continues to have little to no cravings and urges to use. This may be due to the link his use has to the death of his gf and best friend.       DIMENSION 6:  Recovery Environment  Consider the degree to which key areas of the client's life are supportive of or antagonistic to treatment participation and recovery.      Recovery Environment - Current Risk Factor:  3     Support group attended this week: Yes     Did family agree to attend family week:  no     If yes:  na     Goals:    Build a strong sober support network and report to counselor on a weekly basis how that is going through the weekly Check-In worksheet.      Data:  Client lis living at home with his mother and has described his mother has \"my rock\" and rated his living situation at a 1/10.  Progress: Client reported he has been busy reconnecting with his mother and step-father. Client has been going to AA meetings every day since getting out of . Client does have a sponsor.               Intervention:    Group therapy with check-ins   Beginning meditation readings  Discussions and exercise on procrastination and meaning as well as on \"spiritual proverty    Assessment:   Stages of Change Model: Contemplation  Client was engaged in the group exercises and discussions this week and commented on a number of issues that were brought up by the group..    Plan:    Continue with education on mindfulness       MAG Caballero, NOY      "

## 2018-03-27 ENCOUNTER — HOSPITAL ENCOUNTER (OUTPATIENT)
Dept: BEHAVIORAL HEALTH | Facility: CLINIC | Age: 38
End: 2018-03-27
Attending: SOCIAL WORKER
Payer: COMMERCIAL

## 2018-03-27 PROCEDURE — H2035 A/D TX PROGRAM, PER HOUR: HCPCS | Mod: HQ

## 2018-03-28 ENCOUNTER — HOSPITAL ENCOUNTER (OUTPATIENT)
Dept: BEHAVIORAL HEALTH | Facility: CLINIC | Age: 38
End: 2018-03-28
Attending: SOCIAL WORKER
Payer: COMMERCIAL

## 2018-03-28 PROCEDURE — H2035 A/D TX PROGRAM, PER HOUR: HCPCS | Mod: HQ

## 2018-03-29 ENCOUNTER — HOSPITAL ENCOUNTER (OUTPATIENT)
Dept: BEHAVIORAL HEALTH | Facility: CLINIC | Age: 38
End: 2018-03-29
Attending: SOCIAL WORKER
Payer: COMMERCIAL

## 2018-03-29 PROCEDURE — H2035 A/D TX PROGRAM, PER HOUR: HCPCS | Mod: HQ

## 2018-03-29 NOTE — PROGRESS NOTES
Adult CD Treatment Plan Review/Weekly Progress Note      Treatment Plan Review completed on: 3/29/18     Attendance Dates: 3/27, 3/28, 3/29     Total Group Sessions in Phase I: 17 MONDAY TUESDAY WEDNESDAY THURSDAY FRIDAY SATURDAY SUNDAY TOTAL   Group Therapy    2 2 2          6   Speciality Groups*                          1:1                         Family Program                          [place]                            Phase II                            Absent                     Total                 6      *Specialty Groups include Mental Health Care, Assertiveness and Communication, Sobriety Maintenance Skills,   Spiritual Care, Stress Management, Relapse Prevention, Family Systems.                     Learning Style:  Hands on,  Demonstration     Staff Members contributing: MAG Caballero, NOY           Received Supervision: no     Client: contributed to goals and plan     Did Client receive a copy of treatment plan/revised plan:  not yet     Changes to Treatment Plan:  none     Client agrees with plan/revised plan:  na     Any changes in Vulnerable Adult Status:  no    New Goals added since last review: none    Goals worked on since last review: Continue to develop sober support by reaching out to others     Strategies effective: Yes    Strategies need these changes: none     1) Care Coordination Activities:  None  2) Medical, Mental Health and other appointments the client attended: individual session with his counselor  3) Medication issues: none  4) Physical and mental health problems: none  5) Review and evaluation of the individual abuse prevention plan: N/A    Substance Use Disorders:     F11.20      Opioid Use Disorder, severe     F10.20      Alcohol Use Disorder, moderate, in full remission     F14.10      Cocaine Use Disorder, mild, in full remission     F17.200    Tobacco Use Disorder, moderate       ASAM Risk Ratings and Data         DIMENSION 1: Acute Intoxication/Withdrawal  The  client's ability to cope with withdrawal symptoms and current state of intoxication         Acute Intoxication/Withdrawal - Current Risk Factor:  0     Reporting sober date of: 12/31/2017     Goals: Report to counselor any substance use not prescribed by a doctor.      Data: Client did not exhibit or report any signs or sx of intoxication or withdrawal.         DIMENSION 2:  Biomedical Conditions and Complaints  The degree to which any physical disorder would interfere with treatment for substance abuse and the client's ability to tolerate any related discomfort      Biomedical Conditions and Complaints - Current Risk Factor:  1     Data:  Client reported that he had some teeth work done and that he is feeling some pain.       DIMENSION 3:  Emotional/Behavioral/Cognitive Conditions and Complications  The degree to which any condition or complications are likely to interfere with treatment for substance abuse or with function in significant life areas and the likelihood of risk of harm to self or others.      Emotional/Behavioral - Current Risk Factor:  2     DSM-5 Diagnoses:  No diagnosable mental health issue. Client is dealing with grief and loss issues due to death of his gf and best friend     Suicide Assessment:  Emergent?  No  Urgent / Non-Emergent?  No  Present / Non- Urgent?  No.    No Current Risk?  Yes, client reported no thoughts of self harm this week.     Goals:   1. Learn how to manage the symptoms of his anxiety along with his grief issues which include unpleasant/painful thoughts, feelings and sensation in a more helpful way using the ACT tools of mindfulness, acceptance and defusion in order to move his life forward.  2. Using the weekly Check-in worksheet, report severity of any mental health issues and the tools he used to cope with them.  3. Meet with therapist on a weekly basis     Data:  Client reported no thoughts of self-harm this week. Client reported a 5/10 for difficulty falling asleep and  a 3/10 for sadness and worry/rumination.  Client participated the discussions on procrastination and meaning. Client has begun regular meetings with therapist Trina Palomares (see her notes).  Progress: Client was engaged in the discussions on the judging mind along with other topics. Client appears overall to be in a good mental state at this time. Client continues to mention and deal with his grief and loss issues and does address them every week including talking about them in group and to other group members individually.         DIMENSION 4:  Readiness to Change  Consider the amount of support and encouragement necessary to keep the client involved in treatment.      Readiness to Change - Current Risk Factor: 0     Goals:    1. Increase internal motivation to stay sober by focusing and particular values week after week as noted in weekly Check-in and Recovery Action Plan (RAP) worksheet.  2. Define Values based on what he wants his life to be about.     Data:  Client reported that he focused on his values of family and community  Progress: Client reported on actions that he is taking based on his values such as helping his sister to move, paying fines and re-connecting with his step-mother..          DIMENSION 5:  Relapse/Continued Use/Continued Problem Potential  Consider the degree to which the client recognizes relapse issues and has the skills to prevent relapse of either substance use or mental health problems.      Relapse/Continued Use/Continued Problem Potential - Current Risk Factor:  3     Goals:    1. Understand use and relapse patterns  2. Learn how to manage urges and cravings to use through the use of mindfulness and urge surfing techniques     Data:  Client rated his cravings and urges as 1/10. Client reported that the desire to use is just not there anymore.  Progress: No change here. Client continues to have little to no cravings and urges to use. This may be due to the link his use has to the  "death of his gf and best friend.       DIMENSION 6:  Recovery Environment  Consider the degree to which key areas of the client's life are supportive of or antagonistic to treatment participation and recovery.      Recovery Environment - Current Risk Factor:  3     Support group attended this week: Yes     Did family agree to attend family week:  no     If yes:  na     Goals:    Build a strong sober support network and report to counselor on a weekly basis how that is going through the weekly Check-In worksheet.      Data:  Client lis living at home with his mother and has described his mother has \"my rock\" and rated his living situation at a 1/10.  Progress: Client reported he has been busy reconnecting with his step-mother and step-father. Client has been going to AA meetings every day since getting out of . Client does have a sponsor.               Intervention:    Group therapy with check-ins using the ACT Life Map handout  Beginning meditation readings  Discussions and exercise on the judging mind    Assessment:   Stages of Change Model: Contemplation  Client was engaged in the group discussion on the judging mind this week and commented on a number of issues that were brought up by the group.    Plan:    Continue with education on mindfulness       MAG Caballero, LMFLORESITA      "

## 2018-04-03 ENCOUNTER — HOSPITAL ENCOUNTER (OUTPATIENT)
Dept: BEHAVIORAL HEALTH | Facility: CLINIC | Age: 38
End: 2018-04-03
Attending: SOCIAL WORKER
Payer: COMMERCIAL

## 2018-04-03 PROCEDURE — H2035 A/D TX PROGRAM, PER HOUR: HCPCS | Mod: HQ

## 2018-04-04 ENCOUNTER — HOSPITAL ENCOUNTER (OUTPATIENT)
Dept: BEHAVIORAL HEALTH | Facility: CLINIC | Age: 38
End: 2018-04-04
Attending: SOCIAL WORKER
Payer: COMMERCIAL

## 2018-04-04 PROCEDURE — H2035 A/D TX PROGRAM, PER HOUR: HCPCS | Mod: HQ

## 2018-04-04 PROCEDURE — H2035 A/D TX PROGRAM, PER HOUR: HCPCS

## 2018-04-04 NOTE — PROGRESS NOTES
"INDIVIDUAL SESSION SUMMARY    D) Met with client on 18 from 12:30-1:00. Client reported that the last two weeks have been   Really good\". Client reported that he continues to check items off his to-do list including paying the fines to get his drivers license back.  Client spoke about spending Easter weekend with his  ab's family and how he feels \"grateful\" for his extended support network. 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 Client reported that he'll be busy the remained of this month helping his grandmother move from [place] to [place], helping his sister move and then moving himself into the sober house May 1st. Client reported that he is ready to transition to the Phase 2 group on Monday mornings and would like to resume meeting with this therapist e/o week on Monday morning.   I) Individual session with client. Provided client with verbal interventions including: validation and support.  A) Client appears to understand his need to structure and is continuing to structure his day with recovery related activities and connecting with others. Client appears to be balancing the needs of others while still meeting his needs.   P) Next session is scheduled for 18.   Trina Palomares, NOY  2018    "

## 2018-04-05 ENCOUNTER — HOSPITAL ENCOUNTER (OUTPATIENT)
Dept: BEHAVIORAL HEALTH | Facility: CLINIC | Age: 38
End: 2018-04-05
Attending: SOCIAL WORKER
Payer: COMMERCIAL

## 2018-04-05 PROCEDURE — H2035 A/D TX PROGRAM, PER HOUR: HCPCS | Mod: HQ

## 2018-04-05 NOTE — PROGRESS NOTES
Adult CD Treatment Plan Review/Weekly Progress Note      Treatment Plan Review completed on: 4/5/18     Attendance Dates: 4/3, 4/4, 4/5     Total Group Sessions in Phase I: 20 MONDAY TUESDAY WEDNESDAY THURSDAY FRIDAY SATURDAY SUNDAY TOTAL   Group Therapy    2 2 2          6   Speciality Groups*                          1:1           1           1   Family Program                          [place]                            Phase II                            Absent                     Total                 7      *Specialty Groups include Mental Health Care, Assertiveness and Communication, Sobriety Maintenance Skills,   Spiritual Care, Stress Management, Relapse Prevention, Family Systems.                     Learning Style:  Hands on,  Demonstration     Staff Members contributing: MAG Caballero, NOY           Received Supervision: no     Client: contributed to goals and plan     Did Client receive a copy of treatment plan/revised plan:  not yet     Changes to Treatment Plan:  none     Client agrees with plan/revised plan:  na     Any changes in Vulnerable Adult Status:  no    New Goals added since last review: none    Goals worked on since last review: Continue to develop sober support by reaching out to others     Strategies effective: Yes    Strategies need these changes: none     1) Care Coordination Activities:  None  2) Medical, Mental Health and other appointments the client attended: individual session with his counselor  3) Medication issues: none  4) Physical and mental health problems: none  5) Review and evaluation of the individual abuse prevention plan: N/A    Substance Use Disorders:     F11.20      Opioid Use Disorder, severe     F10.20      Alcohol Use Disorder, moderate, in full remission     F14.10      Cocaine Use Disorder, mild, in full remission     F17.200    Tobacco Use Disorder, moderate       ASAM Risk Ratings and Data         DIMENSION 1: Acute Intoxication/Withdrawal  The  Pt/wife informed of message. Patient stated understanding, and agreed to plan of care. Will f/u with creatine labs in 2-4 weeks.   client's ability to cope with withdrawal symptoms and current state of intoxication         Acute Intoxication/Withdrawal - Current Risk Factor:  0     Reporting sober date of: 12/31/2017     Goals: Report to counselor any substance use not prescribed by a doctor.      Data: Client did not exhibit or report any signs or sx of intoxication or withdrawal.         DIMENSION 2:  Biomedical Conditions and Complaints  The degree to which any physical disorder would interfere with treatment for substance abuse and the client's ability to tolerate any related discomfort      Biomedical Conditions and Complaints - Current Risk Factor:  0     Data:  Client reported no physical biomedical issues of note as he moves into phase II. Risk rating lowered to 0.       DIMENSION 3:  Emotional/Behavioral/Cognitive Conditions and Complications  The degree to which any condition or complications are likely to interfere with treatment for substance abuse or with function in significant life areas and the likelihood of risk of harm to self or others.      Emotional/Behavioral - Current Risk Factor:  1     DSM-5 Diagnoses:  No diagnosable mental health issue. Client is dealing with grief and loss issues due to death of his gf and best friend     Suicide Assessment:  Emergent?  No  Urgent / Non-Emergent?  No  Present / Non- Urgent?  No.    No Current Risk?  Yes, client reported no thoughts of self harm this week.     Goals:   1. Learn how to manage the symptoms of his anxiety along with his grief issues which include unpleasant/painful thoughts, feelings and sensation in a more helpful way using the ACT tools of mindfulness, acceptance and defusion in order to move his life forward.  2. Using the weekly Check-in worksheet, report severity of any mental health issues and the tools he used to cope with them.  3. Meet with therapist on a weekly basis     Data:  Client reported no thoughts of self-harm this week. Client reported a 5/10 for  difficulty falling asleep and a 3/10 for intrusive/distressing memories.  Client participated the group discussions on such topics as sober support network. Client has begun regular meetings with therapist Trina Palomares (see her notes).  Progress: Client was engaged in the discussions. Client appears overall to be in a good mental state at this time. Client reported that he has starting going to the gym, started a new diet and is reading more. Client continues to work on being patient and learn more about forgiveness.         DIMENSION 4:  Readiness to Change  Consider the amount of support and encouragement necessary to keep the client involved in treatment.      Readiness to Change - Current Risk Factor: 0     Goals:    1. Increase internal motivation to stay sober by focusing and particular values week after week as noted in weekly Check-in and Recovery Action Plan (RAP) worksheet.  2. Define Values based on what he wants his life to be about.     Data:  Client reported that he focused on his value of responsibility this week.  Progress: Client reported on actions that he is taking based on his values such as taking care of his 's license issues.          DIMENSION 5:  Relapse/Continued Use/Continued Problem Potential  Consider the degree to which the client recognizes relapse issues and has the skills to prevent relapse of either substance use or mental health problems.      Relapse/Continued Use/Continued Problem Potential - Current Risk Factor:  1     Goals:    1. Understand use and relapse patterns  2. Learn how to manage urges and cravings to use through the use of mindfulness and urge surfing techniques     Data:  Client rated his cravings and urges as 1/10. Client reported that the desire to use is just not there anymore.  Progress: Client continues to have little to no cravings and urges to use. Due to client's progress in this area, client risk rating is reduced from 3 to 1.        DIMENSION 6:   "Recovery Environment  Consider the degree to which key areas of the client's life are supportive of or antagonistic to treatment participation and recovery.      Recovery Environment - Current Risk Factor:  2     Support group attended this week: Yes     Did family agree to attend family week:  no     If yes:  na     Goals:    Build a strong sober support network and report to counselor on a weekly basis how that is going through the weekly Check-In worksheet.      Data:  Client lis living at home with his mother and has described his mother has \"my rock\" and rated his living situation at a 1/10. Client reported that he will be moving to a sober house on May 1.   Progress: Client reported he has been busy reconnecting with his step-mother and step-father. Client has been going to AA meetings every day since getting out of . Client does have a sponsor. Due to client progress, risk rating is reduced from a  3 to a 2. It can be reduced to a 1 once he has successfully moved into a sober house.              Intervention:    Group therapy with check-ins   Beginning meditation readings  Discussions on various topics such as sober support network  Handout and discussion on LIVE in ACT    Assessment:   Stages of Change Model: Contemplation  Client was engaged in the group discussions. Client has successfully completed Phase I of his program and will be moving on to Phase II next week.     Plan:    Begin work on his Personal Recovery Care Plan       MAG Caballero, LMFT      "

## 2018-04-05 NOTE — ADDENDUM NOTE
Encounter addended by: Marco Antonio Haley [facility] on: 4/5/2018  5:15 PM<BR>     Actions taken: Sign clinical note

## 2018-04-09 ENCOUNTER — TRANSFERRED RECORDS (OUTPATIENT)
Dept: HEALTH INFORMATION MANAGEMENT | Facility: CLINIC | Age: 38
End: 2018-04-09

## 2018-04-09 ENCOUNTER — HOSPITAL ENCOUNTER (OUTPATIENT)
Dept: BEHAVIORAL HEALTH | Facility: CLINIC | Age: 38
End: 2018-04-09
Attending: SOCIAL WORKER
Payer: COMMERCIAL

## 2018-04-09 PROCEDURE — H2035 A/D TX PROGRAM, PER HOUR: HCPCS | Mod: HQ

## 2018-04-09 NOTE — PROGRESS NOTES
Adult CD Treatment Plan Review/Weekly Progress Note      Treatment Plan Review completed on: 4/9/18     Attendance Dates: 4/9     Total Group Sessions in Phase I: 20  Total Group Sessions in Phase II: 1 MONDAY TUESDAY WEDNESDAY THURSDAY FRIDAY SATURDAY SUNDAY TOTAL   Group Therapy                   Speciality Groups*                          1:1                        Family Program                          [place]                            Phase II     1.5                       Absent                     Total                 1.5      *Specialty Groups include Mental Health Care, Assertiveness and Communication, Sobriety Maintenance Skills,   Spiritual Care, Stress Management, Relapse Prevention, Family Systems.                     Learning Style:  Hands on,  Demonstration     Staff Members contributing: MAG Caballero, NOY           Received Supervision: no     Client: contributed to goals and plan     Did Client receive a copy of treatment plan/revised plan:  not yet     Changes to Treatment Plan:  none     Client agrees with plan/revised plan:  na     Any changes in Vulnerable Adult Status:  no    New Goals added since last review: none    Goals worked on since last review: Continue to develop sober support by reaching out to others     Strategies effective: Yes    Strategies need these changes: none     1) Care Coordination Activities:  None  2) Medical, Mental Health and other appointments the client attended: individual session with his counselor  3) Medication issues: none  4) Physical and mental health problems: none  5) Review and evaluation of the individual abuse prevention plan: N/A    Substance Use Disorders:     F11.20      Opioid Use Disorder, severe     F10.20      Alcohol Use Disorder, moderate, in full remission     F14.10      Cocaine Use Disorder, mild, in full remission     F17.200    Tobacco Use Disorder, moderate       ASAM Risk Ratings and Data         DIMENSION 1: Acute  Intoxication/Withdrawal  The client's ability to cope with withdrawal symptoms and current state of intoxication         Acute Intoxication/Withdrawal - Current Risk Factor:  0     Reporting sober date of: 12/31/2017     Goals: Report to counselor any substance use not prescribed by a doctor.      Data: Client did not exhibit or report any signs or sx of intoxication or withdrawal.         DIMENSION 2:  Biomedical Conditions and Complaints  The degree to which any physical disorder would interfere with treatment for substance abuse and the client's ability to tolerate any related discomfort      Biomedical Conditions and Complaints - Current Risk Factor:  0     Data:  Client reported no physical biomedical issues of note as he moves into phase II. Risk rating lowered to 0.       DIMENSION 3:  Emotional/Behavioral/Cognitive Conditions and Complications  The degree to which any condition or complications are likely to interfere with treatment for substance abuse or with function in significant life areas and the likelihood of risk of harm to self or others.      Emotional/Behavioral - Current Risk Factor:  1     DSM-5 Diagnoses:  No diagnosable mental health issue. Client is dealing with grief and loss issues due to death of his gf and best friend     Suicide Assessment:  Emergent?  No  Urgent / Non-Emergent?  No  Present / Non- Urgent?  No.    No Current Risk?  Yes, client reported no thoughts of self harm this week.     Goals:   1. Learn how to manage the symptoms of his anxiety along with his grief issues which include unpleasant/painful thoughts, feelings and sensation in a more helpful way using the ACT tools of mindfulness, acceptance and defusion in order to move his life forward.  2. Using the weekly Check-in worksheet, report severity of any mental health issues and the tools he used to cope with them.  3. Meet with therapist on a weekly basis     Data:  Client reported no thoughts of self-harm this week.  Client reported a 5/10 for difficulty falling asleep and a 3/10 for intrusive/distressing memories.  Client participated the group discussions on such topics as sober support network. Client has begun regular meetings with therapist Trina Palomares (see her notes).  Progress: Client was engaged in the discussions. Client appears overall to be in a good mental state at this time. Client reported that he has starting going to the gym, started a new diet and is reading more. Client continues to work on being patient and learn more about forgiveness.         DIMENSION 4:  Readiness to Change  Consider the amount of support and encouragement necessary to keep the client involved in treatment.      Readiness to Change - Current Risk Factor: 0     Goals:    1. Increase internal motivation to stay sober by focusing and particular values week after week as noted in weekly Check-in and Recovery Action Plan (RAP) worksheet.  2. Define Values based on what he wants his life to be about.     Data:  Client reported that he focused on his value of responsibility this week.  Progress: Client reported on actions that he is taking based on his values such as taking care of his 's license issues.          DIMENSION 5:  Relapse/Continued Use/Continued Problem Potential  Consider the degree to which the client recognizes relapse issues and has the skills to prevent relapse of either substance use or mental health problems.      Relapse/Continued Use/Continued Problem Potential - Current Risk Factor:  1     Goals:    1. Understand use and relapse patterns  2. Learn how to manage urges and cravings to use through the use of mindfulness and urge surfing techniques     Data:  Client rated his cravings and urges as 1/10. Client reported that the desire to use is just not there anymore.  Progress: Client continues to have little to no cravings and urges to use. Due to client's progress in this area, client risk rating is reduced from 3  "to 1.        DIMENSION 6:  Recovery Environment  Consider the degree to which key areas of the client's life are supportive of or antagonistic to treatment participation and recovery.      Recovery Environment - Current Risk Factor:  2     Support group attended this week: Yes     Did family agree to attend family week:  no     If yes:  na     Goals:    Build a strong sober support network and report to counselor on a weekly basis how that is going through the weekly Check-In worksheet.      Data:  Client lis living at home with his mother and has described his mother has \"my rock\" and rated his living situation at a 1/10. Client reported that he will be moving to a sober house on May 1.   Progress: Client reported he has been busy reconnecting with his step-mother and step-father. Client has been going to AA meetings every day since getting out of . Client does have a sponsor. Due to client progress, risk rating is reduced from a  3 to a 2. It can be reduced to a 1 once he has successfully moved into a sober house.              Intervention:    Group therapy with check-ins using the ACT Life Map   Beginning meditation reading on What is Normal?    Assessment:   Stages of Change Model: Contemplation  Client was engaged in the group discussions and appears to be doing very well in his recovery.  Client successfully completed Phase I of his program and started Phase II this week.     Plan:    Begin work on his Personal Recovery Care Plan       MAG Caballero, LMFLORESITA      "

## 2018-04-16 ENCOUNTER — HOSPITAL ENCOUNTER (OUTPATIENT)
Dept: BEHAVIORAL HEALTH | Facility: CLINIC | Age: 38
End: 2018-04-16
Attending: SOCIAL WORKER
Payer: COMMERCIAL

## 2018-04-16 PROCEDURE — H2035 A/D TX PROGRAM, PER HOUR: HCPCS | Mod: HQ
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
## 2018-04-23 ENCOUNTER — HOSPITAL ENCOUNTER (OUTPATIENT)
Dept: BEHAVIORAL HEALTH | Facility: CLINIC | Age: 38
End: 2018-04-23
Attending: SOCIAL WORKER
Payer: COMMERCIAL

## 2018-04-23 PROCEDURE — H2035 A/D TX PROGRAM, PER HOUR: HCPCS

## 2018-04-23 PROCEDURE — H2035 A/D TX PROGRAM, PER HOUR: HCPCS | Mod: HQ

## 2018-04-23 NOTE — PROGRESS NOTES
"INDIVIDUAL SESSION SUMMARY    D) Met with client on 4/23/18 from 9:00-10:00. Client reported feeling \"grateful and thankful\" for being able to spend time with his grandmother, sister and dad in the last 2 weeks. Client spoke about helping his grandmother move from [place], helping his sister move and attending his dad's award ceremony over the weekend. Client reported that his father is having surgery tomorrow 4/24 and that he is starting both of his new jobs tomorrow. Client reported he will be moving into his sober house next week. 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 Client spoke of needing one day off a week to spend time with family and re-charge emotionally. Client and therapist discussed warning signs that would indicate client had too much on his plate and client spoke about how he can tell his \"mind is friend\" when he is \"quiet\". Client spoke about how important it is that he continue to honor all his commitments, especially with family, as he is still earning back their trust. Therapist and client discussed the difference between external motivation - earning the trust back of others versus internal motivation - earning trust back with himself.    I) Individual session with client. Therapist provided redirection to focus on making decisions in alignment with the client's values versus proving himself to others.   A) Client appears to understand his need to structure and is continuing to structure his day with recovery related activities and connecting with others. Client appears to be balancing the needs of others while still meeting his needs. Client can tend to run away from down time or quiet time as a way of avoiding feelings of grief.   P) Next session is scheduled for 5/7/18.   Trina Palomares, NOY  4/23/2018    "

## 2018-04-23 NOTE — PROGRESS NOTES
Adult CD Treatment Plan Review/Weekly Progress Note      Treatment Plan Review completed on: 4/09/18     Attendance Dates: 4/23     Total Group Sessions in Phase I: 20  Total Group Sessions in Phase II: 3       MONDAY TUESDAY WEDNESDAY THURSDAY FRIDAY SATURDAY ARNIE TOTAL   Group Therapy                   Speciality Groups*                          1:1                        Family Program                          [place]                            Phase II     1.5                       Absent                     Total                 1.5      *Specialty Groups include Mental Health Care, Assertiveness and Communication, Sobriety Maintenance Skills,   Spiritual Care, Stress Management, Relapse Prevention, Family Systems.                     Learning Style:  Hands on,  Demonstration     Staff Members contributing: SAMANTHA Shah, TOOTIE, MAG        Received Supervision: no     Client: contributed to goals and plan     Did Client receive a copy of treatment plan/revised plan:  not yet     Changes to Treatment Plan:  none     Client agrees with plan/revised plan:  na     Any changes in Vulnerable Adult Status:  no    New Goals added since last review: none    Goals worked on since last review: Continue to develop sober support by reaching out to others     Strategies effective: Yes    Strategies need these changes: none     1) Care Coordination Activities:  None  2) Medical, Mental Health and other appointments the client attended: individual session with his counselor  3) Medication issues: none  4) Physical and mental health problems: none  5) Review and evaluation of the individual abuse prevention plan: N/A    Substance Use Disorders:     F11.20      Opioid Use Disorder, severe     F10.20      Alcohol Use Disorder, moderate, in full remission     F14.10      Cocaine Use Disorder, mild, in full remission     F17.200    Tobacco Use Disorder, moderate       ASAM Risk Ratings and Data         DIMENSION 1: Acute  Intoxication/Withdrawal  The client's ability to cope with withdrawal symptoms and current state of intoxication         Acute Intoxication/Withdrawal - Current Risk Factor:  0     Reporting sober date of: 12/31/2017     Goals: Report to counselor any substance use not prescribed by a doctor.      Data: Client did not exhibit or report any signs or symptoms of intoxication or withdrawal.         DIMENSION 2:  Biomedical Conditions and Complaints  The degree to which any physical disorder would interfere with treatment for substance abuse and the client's ability to tolerate any related discomfort      Biomedical Conditions and Complaints - Current Risk Factor:  0     Data:  Client reported no physical biomedical issues of note. Risk rating lowered to 0.       DIMENSION 3:  Emotional/Behavioral/Cognitive Conditions and Complications  The degree to which any condition or complications are likely to interfere with treatment for substance abuse or with function in significant life areas and the likelihood of risk of harm to self or others.      Emotional/Behavioral - Current Risk Factor:  1     DSM-5 Diagnoses:  No diagnosable mental health issue. Client is dealing with grief and loss issues due to death of his girlfriend and bestfriend     Suicide Assessment:  Emergent?  No  Urgent / Non-Emergent?  No  Present / Non- Urgent?  No.    No Current Risk?  Yes, client reported no thoughts of self harm this week.     Goals:   1. Learn how to manage the symptoms of his anxiety along with his grief issues which include unpleasant/painful thoughts, feelings and sensation in a more helpful way using the ACT tools of mindfulness, acceptance and defusion in order to move his life forward.  2. Using the weekly Check-in worksheet, report severity of any mental health issues and the tools he used to cope with them.  3. Meet with therapist on a weekly basis     Data:  Client reported no thoughts of self-harm this week. Client reported  a 4/10 for sleeping more than usual; 3/10 for fatigue; 4/10 for restlessness; 5/10 for increased energy; 3/10 for sleeping less than usual; 4/10 for appetite changes; 3/10 for difficulty falling or staying asleep. Client has begun regular meetings with therapist Trina Palomares.     Progress: Client was engaged in the discussions. Client appears overall to be in a good mental state at this time. Client reported that he is working out. He reported he is giving himself a day off from working out. He is using self-care and awareness of doing too much for coping strategies. He has feelings of grief that come and go and he stays busy to not feel the grief. 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        DIMENSION 4:  Readiness to Change  Consider the amount of support and encouragement necessary to keep the client involved in treatment.      Readiness to Change - Current Risk Factor: 0     Goals:    1. Increase internal motivation to stay sober by focusing and particular values week after week as noted in weekly Check-in and Recovery Action Plan (RAP) worksheet.  2. Define Values based on what he wants his life to be about.     Data:  Client reported that he focused on his value of connection this week. He helped his grandmother complete her move to [place]. Over the weekend, he had his niece and nephew all night. He said it was great time knowing that his sister trusts him to watch them now.      Progress: Client reported on actions that he is taking based on his values such by being connected to his grandmother and his sister and niece and nephews. He shared that a friend's friend  over the weekend from an overdose and he is going to meet with the friend to lend support.        DIMENSION 5:  Relapse/Continued Use/Continued Problem Potential  Consider the degree to which the client recognizes relapse issues and has the  "skills to prevent relapse of either substance use or mental health problems.      Relapse/Continued Use/Continued Problem Potential - Current Risk Factor:  1     Goals:    1. Understand use and relapse patterns  2. Learn how to manage urges and cravings to use through the use of mindfulness and urge surfing techniques     Data:  Client rated his cravings and urges as 1/10.    Progress: Client continues to have little to no cravings and urges to use. Due to client's progress in this area, client risk rating continues at a 1.        DIMENSION 6:  Recovery Environment  Consider the degree to which key areas of the client's life are supportive of or antagonistic to treatment participation and recovery.      Recovery Environment - Current Risk Factor:  2     Support group attended this week: Yes     Did family agree to attend family week:  no     If yes:  na     Goals:    Build a strong sober support network and report to counselor on a weekly basis how that is going through the weekly Check-In worksheet.      Data:  Client is living at home with his mother and rated his living situation at a 1/10. He went to a sober support group meeting each night, except Saturday.     Progress: Client reported that being around family this past week was \"a huge help for me.\" He went to one support meeting on Monday and Tuesday and one in [place] on Friday. He plans to resume daily AA meetings. The risk rating can be reduced to a 1 once he has successfully moved into a sober house.              Intervention:    Group therapy with check-ins.  Beginning meditation reading on recovery is hard because it's unlearning learned behaviors.     Assessment:   Stages of Change Model: Preparation  Client was engaged in the group discussions and appears to be doing very well in his recovery.      Plan:    Continue working on his Personal Recovery Care Plan       Maria Del Carmen Martinez, SAMANTHA, LICSW, LADC        "

## 2018-04-30 ENCOUNTER — HOSPITAL ENCOUNTER (OUTPATIENT)
Dept: BEHAVIORAL HEALTH | Facility: CLINIC | Age: 38
End: 2018-04-30
Attending: SOCIAL WORKER
Payer: COMMERCIAL

## 2018-04-30 PROCEDURE — H2035 A/D TX PROGRAM, PER HOUR: HCPCS | Mod: HQ

## 2018-04-30 NOTE — PROGRESS NOTES
Adult CD Treatment Plan Review/Weekly Progress Note      Treatment Plan Review completed on: 4/09/18     Attendance Dates: 04/30/2018     Total Group Sessions in Phase I: 20  Total Group Sessions in Phase II: 4 MONDAY TUESDAY WEDNESDAY THURSDAY FRIDAY SATURDAY SUNDAY TOTAL   Group Therapy                   Speciality Groups*                          1:1   1                     Family Program                          [place]                            Phase II     1.5                       Absent                     Total                 2.5      *Specialty Groups include Mental Health Care, Assertiveness and Communication, Sobriety Maintenance Skills,   Spiritual Care, Stress Management, Relapse Prevention, Family Systems.                     Learning Style:  Hands on,  Demonstration     Staff Members contributing: SAMANTHA Shah, TOOTIE, MAG        Received Supervision: no     Client: contributed to goals and plan     Did Client receive a copy of treatment plan/revised plan:  not yet     Changes to Treatment Plan:  none     Client agrees with plan/revised plan:  na     Any changes in Vulnerable Adult Status:  no    New Goals added since last review: none    Goals worked on since last review: Continue to develop sober support by reaching out to others     Strategies effective: Yes    Strategies need these changes: none     1) Care Coordination Activities:  None  2) Medical, Mental Health and other appointments the client attended: individual session with his counselor  3) Medication issues: none  4) Physical and mental health problems: none  5) Review and evaluation of the individual abuse prevention plan: N/A    Substance Use Disorders:     F11.20      Opioid Use Disorder, severe     F10.20      Alcohol Use Disorder, moderate, in full remission     F14.10      Cocaine Use Disorder, mild, in full remission     F17.200    Tobacco Use Disorder, moderate       ASAM Risk Ratings and Data         DIMENSION 1:  Acute Intoxication/Withdrawal  The client's ability to cope with withdrawal symptoms and current state of intoxication         Acute Intoxication/Withdrawal - Current Risk Factor:  0     Reporting sober date of: 12/31/2017     Goals: Report to counselor any substance use not prescribed by a doctor.      Data: Client did not exhibit or report any signs or symptoms of intoxication or withdrawal.         DIMENSION 2:  Biomedical Conditions and Complaints  The degree to which any physical disorder would interfere with treatment for substance abuse and the client's ability to tolerate any related discomfort      Biomedical Conditions and Complaints - Current Risk Factor:  0     Data:  Client reported no physical biomedical issues of note. Risk rating lowered to 0.       DIMENSION 3:  Emotional/Behavioral/Cognitive Conditions and Complications  The degree to which any condition or complications are likely to interfere with treatment for substance abuse or with function in significant life areas and the likelihood of risk of harm to self or others.      Emotional/Behavioral - Current Risk Factor:  1     DSM-5 Diagnoses:  No diagnosable mental health issue. Client is dealing with grief and loss issues due to death of his girlfriend and bestfriend     Suicide Assessment:  Emergent?  No  Urgent / Non-Emergent?  No  Present / Non- Urgent?  No.    No Current Risk?  Yes, client reported no thoughts of self harm this week.     Goals:   1. Learn how to manage the symptoms of his anxiety along with his grief issues which include unpleasant/painful thoughts, feelings and sensation in a more helpful way using the ACT tools of mindfulness, acceptance and defusion in order to move his life forward.  2. Using the weekly Check-in worksheet, report severity of any mental health issues and the tools he used to cope with them.  3. Meet with therapist on a weekly basis     Data:  Client reported no thoughts of self-harm this week. Client  reported a 5/10 for fatigue; 3/10 for restlessness; 3/10 for muscle tension; 6/10 for sleeping less than usual; 5/10 for appetite changes. Client has continues with regular meetings with therapist Trina Palomares. He reported he slept in until 7am this morning and didn't work out because he worked all weekend and didn't get much sleep. He is using organization and self-care for coping strategies.     Progress: Client was engaged in the discussions. He completed training at [facility] and also started a 2nd job [employer]. He shared that a news station called him and wanted to talk about the deaths of his girlfriend and friend. He was upset but contacted his  and friends, who all recommended he not reach out to the news station.        DIMENSION 4:  Readiness to Change  Consider the amount of support and encouragement necessary to keep the client involved in treatment.      Readiness to Change - Current Risk Factor: 0     Goals:    1. Increase internal motivation to stay sober by focusing and particular values week after week as noted in weekly Check-in and Recovery Action Plan (RAP) worksheet.  2. Define Values based on what he wants his life to be about.     Data:  Client reported that he focused on his values responsibility and accountability. He worked all weekend and also spent time with his friend's child.     Progress: Client reported on actions that he is taking based on his values such by continuing to connect with friends.        DIMENSION 5:  Relapse/Continued Use/Continued Problem Potential  Consider the degree to which the client recognizes relapse issues and has the skills to prevent relapse of either substance use or mental health problems.      Relapse/Continued Use/Continued Problem Potential - Current Risk Factor:  1     Goals:    1. Understand use and relapse patterns  2. Learn how to manage urges and cravings to use through the use of mindfulness and urge surfing techniques     Data:  Client  rated his cravings and urges as 1/10.    Progress: Client continues to have little to no cravings and urges to use. Due to client's progress in this area, client risk rating continues at a 1.        DIMENSION 6:  Recovery Environment  Consider the degree to which key areas of the client's life are supportive of or antagonistic to treatment participation and recovery.      Recovery Environment - Current Risk Factor:  2     Support group attended this week: Yes     Did family agree to attend family week:  no     If yes:  na     Goals:    Build a strong sober support network and report to counselor on a weekly basis how that is going through the weekly Check-In worksheet.      Data:  Client is living at home with his mother and rated his living situation at a 1/10. He went to 5 sober support groups  This week. He met with his sponsor.     Progress: Client reported that he continues to be around family and friends when he wasn't working.               Intervention:    Group therapy with check-ins.  Beginning meditation reading on recovery is hard because it's unlearning learned behaviors.     Assessment:   Stages of Change Model: Preparation  Client was engaged in the group discussions and appears to be doing very well in his recovery.      Plan:    Continue working on his Personal Recovery Care Plan       Maria Del Carmen Martinez, MSW, LICSW, LADC

## 2018-05-14 ENCOUNTER — HOSPITAL ENCOUNTER (OUTPATIENT)
Dept: BEHAVIORAL HEALTH | Facility: CLINIC | Age: 38
End: 2018-05-14
Attending: SOCIAL WORKER
Payer: COMMERCIAL

## 2018-05-14 PROCEDURE — H2035 A/D TX PROGRAM, PER HOUR: HCPCS | Mod: HQ

## 2018-05-14 NOTE — PROGRESS NOTES
Adult CD Treatment Plan Review/Weekly Progress Note      Treatment Plan Review completed on: 4/09/18     Attendance Dates: 05/14/2018     Total Group Sessions in Phase I: 20  Total Group Sessions in Phase II: 5 MONDAY TUESDAY WEDNESDAY THURSDAY FRIDAY SATURDAY SUNDAY TOTAL   Group Therapy                   Speciality Groups*                          1:1   1                     Family Program                          [place]                            Phase II     1.5                       Absent                     Total                 2.5      *Specialty Groups include Mental Health Care, Assertiveness and Communication, Sobriety Maintenance Skills,   Spiritual Care, Stress Management, Relapse Prevention, Family Systems.                     Learning Style:  Hands on,  Demonstration     Staff Members contributing: SAMANTHA Shah, TOOTIE, MAG        Received Supervision: no     Client: contributed to goals and plan     Did Client receive a copy of treatment plan/revised plan:  not yet     Changes to Treatment Plan:  none     Client agrees with plan/revised plan:  na     Any changes in Vulnerable Adult Status:  no    New Goals added since last review: none    Goals worked on since last review: Continue to develop sober support by reaching out to others     Strategies effective: Yes    Strategies need these changes: none     1) Care Coordination Activities:  None  2) Medical, Mental Health and other appointments the client attended: individual session with his counselor  3) Medication issues: none  4) Physical and mental health problems: none  5) Review and evaluation of the individual abuse prevention plan: N/A    Substance Use Disorders:     F11.20      Opioid Use Disorder, severe     F10.20      Alcohol Use Disorder, moderate, in full remission     F14.10      Cocaine Use Disorder, mild, in full remission     F17.200    Tobacco Use Disorder, moderate       ASAM Risk Ratings and Data         DIMENSION  1: Acute Intoxication/Withdrawal  The client's ability to cope with withdrawal symptoms and current state of intoxication         Acute Intoxication/Withdrawal - Current Risk Factor:  0     Reporting sober date of: 12/31/2017     Goals: Report to counselor any substance use not prescribed by a doctor.      Data: Client did not exhibit or report any signs or symptoms of intoxication or withdrawal.         DIMENSION 2:  Biomedical Conditions and Complaints  The degree to which any physical disorder would interfere with treatment for substance abuse and the client's ability to tolerate any related discomfort      Biomedical Conditions and Complaints - Current Risk Factor:  0     Data:  Client reported no physical biomedical issues of note. Risk rating lowered to 0.       DIMENSION 3:  Emotional/Behavioral/Cognitive Conditions and Complications  The degree to which any condition or complications are likely to interfere with treatment for substance abuse or with function in significant life areas and the likelihood of risk of harm to self or others.      Emotional/Behavioral - Current Risk Factor:  1     DSM-5 Diagnoses:  No diagnosable mental health issue. Client is dealing with grief and loss issues due to death of his girlfriend and bestfriend     Suicide Assessment:  Emergent?  No  Urgent / Non-Emergent?  No  Present / Non- Urgent?  No.    No Current Risk?  Yes, client reported no thoughts of self harm this week.     Goals:   1. Learn how to manage the symptoms of his anxiety along with his grief issues which include unpleasant/painful thoughts, feelings and sensation in a more helpful way using the ACT tools of mindfulness, acceptance and defusion in order to move his life forward.  2. Using the weekly Check-in worksheet, report severity of any mental health issues and the tools he used to cope with them.  3. Meet with therapist on a weekly basis     Data:  Client reported no thoughts of self-harm this week. Client  reported a 3/10 for sleeping more than usual; 5/10 for fatigue; 3/10 for restlessness; 6/10 for increased energy; 3/10 for grandiosity; 4/10 for sleeping less than usual; 3/10 for appetite changes; 4/10 for difficulty falling or staying asleep.     Progress: Client shared that the story about him is airing tonight. He is upset by it. He will watch it alone, but knows that he can call his sponsor or good friends afterward to process it. He is not worried about using. He is trying to not control the things he cannot control.        DIMENSION 4:  Readiness to Change  Consider the amount of support and encouragement necessary to keep the client involved in treatment.      Readiness to Change - Current Risk Factor: 0     Goals:    1. Increase internal motivation to stay sober by focusing and particular values week after week as noted in weekly Check-in and Recovery Action Plan (RAP) worksheet.  2. Define Values based on what he wants his life to be about.     Data:  Client reported that he focused on his values responsibility, family, and work ethic.     Progress: Client reported on actions that he is taking based on his values such by continuing to connect with friends and invest in work. He is keeping himself busy in productive ways.         DIMENSION 5:  Relapse/Continued Use/Continued Problem Potential  Consider the degree to which the client recognizes relapse issues and has the skills to prevent relapse of either substance use or mental health problems.      Relapse/Continued Use/Continued Problem Potential - Current Risk Factor:  1     Goals:    1. Understand use and relapse patterns  2. Learn how to manage urges and cravings to use through the use of mindfulness and urge surfing techniques     Data:  Client rated his cravings and urges as 1/10.    Progress: Client continues to have little to no cravings and urges to use. Due to client's progress in this area, client risk rating continues at a 1.        DIMENSION  "6:  Recovery Environment  Consider the degree to which key areas of the client's life are supportive of or antagonistic to treatment participation and recovery.      Recovery Environment - Current Risk Factor:  2     Support group attended this week: Yes - 4 AA meetings and did service work.      Did family agree to attend family week:  no     If yes:  na     Goals:    Build a strong sober support network and report to counselor on a weekly basis how that is going through the weekly Check-In worksheet.      Data:  Client is living at home with his mother and rated his living situation at a 1/10. He stated being back around the area he grew up has felt really good.     Progress: Client was asked to speak to some board members for a medical device company about a new product that could be very influential in the sober community.               Intervention:    Group therapy with check-ins   Meditation Reading on identifying the small \"comprises\" that add up to a bigger slip.     Assessment:   Stages of Change Model: Preparation  Client was engaged in the group discussions and appears to be doing very well in his recovery.      Plan:    Continue working on his Personal Recovery Care Plan       SAMANTHA Shah, LICSW, LADC        "

## 2018-05-21 ENCOUNTER — HOSPITAL ENCOUNTER (OUTPATIENT)
Dept: BEHAVIORAL HEALTH | Facility: CLINIC | Age: 38
End: 2018-05-21
Attending: SOCIAL WORKER
Payer: COMMERCIAL

## 2018-05-21 PROCEDURE — H2035 A/D TX PROGRAM, PER HOUR: HCPCS | Mod: HQ

## 2018-05-21 NOTE — PROGRESS NOTES
Adult CD Treatment Plan Review/Weekly Progress Note      Treatment Plan Review completed on: 4/09/18     Attendance Dates: 05/21/2018      Total Group Sessions in Phase I: 20  Total Group Sessions in Phase II: 6 MONDAY TUESDAY WEDNESDAY THURSDAY FRIDAY SATURDAY SUNDAY TOTAL   Group Therapy                   Speciality Groups*                          1:1                      Family Program                          [place]                            Phase II     1.5                       Absent                     Total                 1.5      *Specialty Groups include Mental Health Care, Assertiveness and Communication, Sobriety Maintenance Skills,   Spiritual Care, Stress Management, Relapse Prevention, Family Systems.                     Learning Style:  Hands on,  Demonstration     Staff Members contributing: SAMANTHA Shah, TOOTIE, MAG        Received Supervision: no     Client: contributed to goals and plan     Did Client receive a copy of treatment plan/revised plan:  not yet     Changes to Treatment Plan:  none     Client agrees with plan/revised plan:  na     Any changes in Vulnerable Adult Status:  no    New Goals added since last review: none    Goals worked on since last review: Continue to develop sober support by reaching out to others     Strategies effective: Yes    Strategies need these changes: none     1) Care Coordination Activities:  None  2) Medical, Mental Health and other appointments the client attended: individual session with his counselor  3) Medication issues: none  4) Physical and mental health problems: none  5) Review and evaluation of the individual abuse prevention plan: N/A    Substance Use Disorders:     F11.20      Opioid Use Disorder, severe     F10.20      Alcohol Use Disorder, moderate, in full remission     F14.10      Cocaine Use Disorder, mild, in full remission     F17.200    Tobacco Use Disorder, moderate       ASAM Risk Ratings and Data         DIMENSION 1:  Acute Intoxication/Withdrawal  The client's ability to cope with withdrawal symptoms and current state of intoxication      Acute Intoxication/Withdrawal - Current Risk Factor:  0     Reporting sober date of: 12/31/2017     Goals: Report to counselor any substance use not prescribed by a doctor.      Data: Client did not exhibit or report any signs or symptoms of intoxication or withdrawal.        DIMENSION 2:  Biomedical Conditions and Complaints  The degree to which any physical disorder would interfere with treatment for substance abuse and the client's ability to tolerate any related discomfort      Biomedical Conditions and Complaints - Current Risk Factor:  0     Data:  Client reported no physical biomedical issues of note. Risk rating lowered to 0.       DIMENSION 3:  Emotional/Behavioral/Cognitive Conditions and Complications  The degree to which any condition or complications are likely to interfere with treatment for substance abuse or with function in significant life areas and the likelihood of risk of harm to self or others.      Emotional/Behavioral - Current Risk Factor:  1     DSM-5 Diagnoses:  No diagnosable mental health issue. Client is dealing with grief and loss issues due to death of his girlfriend and bestfriend     Suicide Assessment:  Emergent?  No  Urgent / Non-Emergent?  No  Present / Non- Urgent?  No.    No Current Risk?  Yes, client reported no thoughts of self harm this week.     Goals:   1. Learn how to manage the symptoms of his anxiety along with his grief issues which include unpleasant/painful thoughts, feelings and sensation in a more helpful way using the ACT tools of mindfulness, acceptance and defusion in order to move his life forward.  2. Using the weekly Check-in worksheet, report severity of any mental health issues and the tools he used to cope with them.  3. Meet with therapist on a weekly basis     Data:  Client reported no thoughts of self-harm this week. Client  reported a 5/10 for fatigue; 5/10 for restlessness; 5/10 for increased energy; 5/10 for sleeping less than usual; 3/10 for difficulty falling or staying asleep.     Progress: Client shared that his week has been up and down. He watched the news story about his girlfriend with his sponsor. It was terrible to see his girlfriend used as an example. He had a meeting with innovators about a product that could help opiate users. He learned that his stepfather's father will die soon. He shared that he is realizing that life is a lot of ups and downs and he is doing well.        DIMENSION 4:  Readiness to Change  Consider the amount of support and encouragement necessary to keep the client involved in treatment.      Readiness to Change - Current Risk Factor: 0     Goals:    1. Increase internal motivation to stay sober by focusing and particular values week after week as noted in weekly Check-in and Recovery Action Plan (RAP) worksheet.  2. Define Values based on what he wants his life to be about.     Data:  Client reported that he focused on his values of self-care.      Progress: Client reported on actions so that he can take care of himself. The recuperation allows him to see the other things he does as worthwhile. He also continues to show up and help his family.        DIMENSION 5:  Relapse/Continued Use/Continued Problem Potential  Consider the degree to which the client recognizes relapse issues and has the skills to prevent relapse of either substance use or mental health problems.      Relapse/Continued Use/Continued Problem Potential - Current Risk Factor:  1     Goals:    1. Understand use and relapse patterns  2. Learn how to manage urges and cravings to use through the use of mindfulness and urge surfing techniques     Data:  Client rated his cravings and urges as 1/10.    Progress: Client continues to have little to no cravings and urges to use. Due to client's progress in this area, client risk rating  continues at a 1.        DIMENSION 6:  Recovery Environment  Consider the degree to which key areas of the client's life are supportive of or antagonistic to treatment participation and recovery.      Recovery Environment - Current Risk Factor:  2     Support group attended this week: Yes - 4 AA meetings     Did family agree to attend family week:  no     If yes:  na     Goals:    Build a strong sober support network and report to counselor on a weekly basis how that is going through the weekly Check-In worksheet.      Data:  Client is living at home with his mother and rated his living situation at a 1/10. He stated that being around his family has been very helpful, especially after the news story.     Progress: Client is volunteering at [facility] for [employer] and his home group.               Intervention:    Group therapy with check-ins and Identifying and combating different types of boredom.   Meditation Reading on identifying the small change adds up to larger and healthier habits.      Assessment:   Stages of Change Model: Preparation  Client was engaged in the group discussions and appears to be doing very well in his recovery.      Plan:    Continue working on his Personal Recovery Care Plan  Client will transfer to the evening Phase II group at [facility].        Maria Del Carmen Martinez, SAMANTHA, LICSW, LADC

## 2018-05-21 NOTE — PROGRESS NOTES
Therapist emailed client about scheduling a closing session and client indicated that he his work schedule does not allow him time to continue therapy. Client indicated that he will let the therapist know if he needs anything in the next month. This therapist emailed the client three therapist referrals close to the client's home.   NOY Mendoza

## 2018-05-30 ENCOUNTER — HOSPITAL ENCOUNTER (OUTPATIENT)
Dept: BEHAVIORAL HEALTH | Facility: CLINIC | Age: 38
End: 2018-05-30
Attending: SOCIAL WORKER
Payer: COMMERCIAL

## 2018-05-30 ENCOUNTER — TRANSFERRED RECORDS (OUTPATIENT)
Dept: HEALTH INFORMATION MANAGEMENT | Facility: CLINIC | Age: 38
End: 2018-05-30

## 2018-05-30 PROCEDURE — H2035 A/D TX PROGRAM, PER HOUR: HCPCS | Mod: HQ

## 2018-06-01 NOTE — PROGRESS NOTES
Adult CD Treatment Plan Review/Weekly Progress Note      Treatment Plan Review completed on: 5/31/2018      Attendance Dates: Client attended Phase 2 group on Wednesday 5/30/18 for a total of 2 hours.      Total Group Sessions in Phase I: 20  Total Group Sessions in Phase II: 6 MONDAY TUESDAY WEDNESDAY THURSDAY FRIDAY SATURDAY SUNDAY TOTAL   Group Therapy                   Speciality Groups*                          1:1                      Family Program                          [place]                            Phase II         2                2   Absent                     Total        2         2      *Specialty Groups include Mental Health Care, Assertiveness and Communication, Sobriety Maintenance Skills,   Spiritual Care, Stress Management, Relapse Prevention, Family Systems.                     Learning Style:  Hands on,  Demonstration     Staff Members contributing: MAG Esparza     Received Supervision: no     Client: contributed to goals and plan     Did Client receive a copy of treatment plan/revised plan:  not yet     Changes to Treatment Plan:  none     Client agrees with plan/revised plan:  na     Any changes in Vulnerable Adult Status:  no    New Goals added since last review: none    Goals worked on since last review: Continue to develop sober support by reaching out to others     Strategies effective: Yes    Strategies need these changes: none     1) Care Coordination Activities:  None  2) Medical, Mental Health and other appointments the client attended: None reported  3) Medication issues: none  4) Physical and mental health problems: none  5) Review and evaluation of the individual abuse prevention plan: N/A    Substance Use Disorders:     F11.20      Opioid Use Disorder, severe     F10.20      Alcohol Use Disorder, moderate, in full remission     F14.10      Cocaine Use Disorder, mild, in full remission     F17.200    Tobacco Use Disorder, moderate       ASAM Risk Ratings  and Data         DIMENSION 1: Acute Intoxication/Withdrawal  The client's ability to cope with withdrawal symptoms and current state of intoxication      Acute Intoxication/Withdrawal - Current Risk Factor:  0     Reporting sober date of: 12/31/2017     Goals: Report to counselor any substance use not prescribed by a doctor.      Data: Client did not exhibit or report any signs or symptoms of intoxication or withdrawal.      DIMENSION 2:  Biomedical Conditions and Complaints  The degree to which any physical disorder would interfere with treatment for substance abuse and the client's ability to tolerate any related discomfort      Biomedical Conditions and Complaints - Current Risk Factor:  0     Data:  Client reported no physical biomedical issues of note. Risk rating lowered to 0.    DIMENSION 3:  Emotional/Behavioral/Cognitive Conditions and Complications  The degree to which any condition or complications are likely to interfere with treatment for substance abuse or with function in significant life areas and the likelihood of risk of harm to self or others.      Emotional/Behavioral - Current Risk Factor:  1     DSM-5 Diagnoses:  No diagnosable mental health issue. Client is dealing with grief and loss issues due to death of his girlfriend and bestfriend     Suicide Assessment:  Emergent?  No  Urgent / Non-Emergent?  No  Present / Non- Urgent?  No.    No Current Risk?  Yes, client reported no thoughts of self harm this week.     Goals:   1. Learn how to manage the symptoms of his anxiety along with his grief issues which include unpleasant/painful thoughts, feelings and sensation in a more helpful way using the ACT tools of mindfulness, acceptance and defusion in order to move his life forward.  2. Using the weekly Check-in worksheet, report severity of any mental health issues and the tools he used to cope with them.  3. Meet with therapist on a weekly basis    Data: See DIAP below.      DIMENSION 4:   "Readiness to Change  Consider the amount of support and encouragement necessary to keep the client involved in treatment.      Readiness to Change - Current Risk Factor: 0     Goals:    1. Increase internal motivation to stay sober by focusing and particular values week after week as noted in weekly Check-in and Recovery Action Plan (RAP) worksheet.  2. Define Values based on what he wants his life to be about.     Data: Client reported he moved his life forward this week by \"took responsibility for everything and worked 85 hours\".     DIMENSION 5:  Relapse/Continued Use/Continued Problem Potential  Consider the degree to which the client recognizes relapse issues and has the skills to prevent relapse of either substance use or mental health problems.      Relapse/Continued Use/Continued Problem Potential - Current Risk Factor:  1     Goals:    1. Understand use and relapse patterns  2. Learn how to manage urges and cravings to use through the use of mindfulness and urge surfing techniques     Data:  Client reported cravings to use at a 1 on a scale of 1-10, 1 being \"none\" and 10 being \"I used\".       DIMENSION 6:  Recovery Environment  Consider the degree to which key areas of the client's life are supportive of or antagonistic to treatment participation and recovery.      Recovery Environment - Current Risk Factor:  2     Support group attended this week: Yes - 4 AA meetings     Did family agree to attend family week:  no     If yes:  na     Goals:    Build a strong sober support network and report to counselor on a weekly basis how that is going through the weekly Check-In worksheet.      Data:  Client is living at home with his mother and rated his living situation at a 1/10. He stated that being around his family has been very helpful, especially after the news story.     Progress: Client reports on a scale of 1-10, 1 being completely antagonistic and 10 being 100% supportive for recovery, his living situation " "is a 10. Client reported he is staying with is father to help him for the next few months because he just underwent major surgery.              Goals:   1. Learn how to manage the symptoms of his anxiety along with his grief issues which include unpleasant/painful thoughts, feelings and sensation in a more helpful way using the ACT tools of mindfulness, acceptance and defusion in order to move his life forward.  2. Using the weekly Check-in worksheet, report severity of any mental health issues and the tools he used to cope with them.  3. Meet with therapist on a weekly basis    Data: Client denied suicidal ideation or self-injurious behavior. Client rated the following MH symptoms on a scale of 1-10 (0=did not endorse): sleeping more than usual- 0, fatigue- 4, difficulty concentrating- 0, restlessness- 0, mood swings- 0, irritability- 0, lack of interest or pleasure in activities- 0, hopelessness- 0, muscle tension- 0, shakiness- 0, sadness- 0, worry/rumination- 0, increased energy- 5, grandiosity- 3, sleeping less than usual- 5, appetite changes- 4, intrusive/distressing memories- 0, difficulty falling or staying asleep- 5. Client reported his symptoms were related to \"self-care and working a lot of hours\". Client reported he wants to practice using coping skills of \"self-reflection after each day, seeing what I can improve from previous day.\" Client reported he currently works 2 full-time jobs and typically feels so exhausted at the end of the day he is not able to think about his grief and loss. Client reported he attends [employer] 2 times a week and goes to meetings 5 times per week. Client reported his sponsor is coaching him to keep two jobs and stay busy. Client reported Memorial day was his first day off in 3 months.     Intervention: Facilitated client's introduction to new EOP group. Explored client's current routine of working 2 jobs to avoid his grief and emotions. Client heard feedback from group members " on the unsustainability of this plan. Counselor validated client's desire to focus on external things such as a job, but discussed how our emotions and grief is still present whether we acknowledge it or not. Discussed how we can embrace and accept our emotions and grief as a part of life.     Assessment:   Stages of Change Model: Action  Client appears to be surrounding himself with sober supports, client appears to be following the advice and recommendations of those around him. Client appears to have insight into how his emotions and thoughts can increase his risk of relapse.     Plan: Continue client in Phase 2.     MAG Esparza

## 2018-06-06 ENCOUNTER — HOSPITAL ENCOUNTER (OUTPATIENT)
Dept: BEHAVIORAL HEALTH | Facility: CLINIC | Age: 38
End: 2018-06-06
Attending: SOCIAL WORKER
Payer: COMMERCIAL

## 2018-06-06 PROCEDURE — H2035 A/D TX PROGRAM, PER HOUR: HCPCS | Mod: HQ

## 2018-06-07 NOTE — PROGRESS NOTES
Adult CD Treatment Plan Review/Weekly Progress Note      Treatment Plan Review completed on: 6/7/2018      Attendance Dates: Client attended Phase 2 group on Wednesday 6/6/18 for a total of 2 hours.      Total Group Sessions in Phase I: 20  Total Group Sessions in Phase II: 7 MONDAY TUESDAY WEDNESDAY THURSDAY FRIDAY SATURDAY SUNDAY TOTAL   Group Therapy                   Speciality Groups*                          1:1                      Family Program                          [place]                            Phase II         2                2   Absent                     Total        2         2      *Specialty Groups include Mental Health Care, Assertiveness and Communication, Sobriety Maintenance Skills,   Spiritual Care, Stress Management, Relapse Prevention, Family Systems.                     Learning Style:  Hands on,  Demonstration     Staff Members contributing: MAG Esparza     Received Supervision: no     Client: contributed to goals and plan     Did Client receive a copy of treatment plan/revised plan:  not yet     Changes to Treatment Plan:  none     Client agrees with plan/revised plan:  na     Any changes in Vulnerable Adult Status:  no    New Goals added since last review: none    Goals worked on since last review: Continue to develop sober support by reaching out to others     Strategies effective: Yes    Strategies need these changes: none     1) Care Coordination Activities:  None  2) Medical, Mental Health and other appointments the client attended: None reported  3) Medication issues: none  4) Physical and mental health problems: none  5) Review and evaluation of the individual abuse prevention plan: N/A    Substance Use Disorders:     F11.20      Opioid Use Disorder, severe     F10.20      Alcohol Use Disorder, moderate, in full remission     F14.10      Cocaine Use Disorder, mild, in full remission     F17.200    Tobacco Use Disorder, moderate       ASAM Risk Ratings and  Data         DIMENSION 1: Acute Intoxication/Withdrawal  The client's ability to cope with withdrawal symptoms and current state of intoxication      Acute Intoxication/Withdrawal - Current Risk Factor:  0     Reporting sober date of: 12/31/2017     Goals: Report to counselor any substance use not prescribed by a doctor.      Data: Client did not exhibit or report any signs or symptoms of intoxication or withdrawal.      DIMENSION 2:  Biomedical Conditions and Complaints  The degree to which any physical disorder would interfere with treatment for substance abuse and the client's ability to tolerate any related discomfort      Biomedical Conditions and Complaints - Current Risk Factor:  0     Data:  Client reported no physical biomedical issues of note.     DIMENSION 3:  Emotional/Behavioral/Cognitive Conditions and Complications  The degree to which any condition or complications are likely to interfere with treatment for substance abuse or with function in significant life areas and the likelihood of risk of harm to self or others.      Emotional/Behavioral - Current Risk Factor:  1     DSM-5 Diagnoses:  No diagnosable mental health issue. Client is dealing with grief and loss issues due to death of his girlfriend and bestfriend     Suicide Assessment:  Emergent?  No  Urgent / Non-Emergent?  No  Present / Non- Urgent?  No.    No Current Risk?  Yes, client reported no thoughts of self harm this week.     Goals:   1. Learn how to manage the symptoms of his anxiety along with his grief issues which include unpleasant/painful thoughts, feelings and sensation in a more helpful way using the ACT tools of mindfulness, acceptance and defusion in order to move his life forward.  2. Using the weekly Check-in worksheet, report severity of any mental health issues and the tools he used to cope with them.  3. Meet with therapist on a weekly basis    Data: Client denied suicidal ideation or self-injurious behavior. Client rated  "the following MH symptoms on a scale of 1-10 (0=did not endorse): sleeping more than usual- 3, fatigue- 5, difficulty concentrating- 1, restlessness- 5, mood swings- 1, irritability- 1, lack of interest or pleasure in activities- 1, hopelessness- 1, muscle tension- 1, shakiness- 1, sadness- 1, worry/rumination- 1, increased energy- 5, grandiosity- 1, sleeping less than usual- 6, appetite changes- 1, intrusive/distressing memories- 1, difficulty falling or staying asleep- 3. Client reported he used the coping skill of \"self-awareness\", stating that \"I really know where my head and body are and why\". Client reported he wants to further practice \"meditation\". Client reported he experienced multiple losses this week including his step grandfather , his step-mom's sister , and it was his late ab's birthday. Client reported his father is also in the hospital.      DIMENSION 4:  Readiness to Change  Consider the amount of support and encouragement necessary to keep the client involved in treatment.      Readiness to Change - Current Risk Factor: 0     Goals:    1. Increase internal motivation to stay sober by focusing and particular values week after week as noted in weekly Check-in and Recovery Action Plan (RAP) worksheet.  2. Define Values based on what he wants his life to be about.     Data: Client reported he moved his life forward this week by \"dealt with my ab's first birthday without her\". Client reported he spent that day with her family.     DIMENSION 5:  Relapse/Continued Use/Continued Problem Potential  Consider the degree to which the client recognizes relapse issues and has the skills to prevent relapse of either substance use or mental health problems.      Relapse/Continued Use/Continued Problem Potential - Current Risk Factor:  1     Goals:    1. Understand use and relapse patterns  2. Learn how to manage urges and cravings to use through the use of mindfulness and urge surfing " "techniques     Data:  Client reported cravings to use at a 1 on a scale of 1-10, 1 being \"none\" and 10 being \"I used\".       DIMENSION 6:  Recovery Environment  Consider the degree to which key areas of the client's life are supportive of or antagonistic to treatment participation and recovery.      Recovery Environment - Current Risk Factor:  2     Support group attended this week: Yes - 4 AA meetings     Did family agree to attend family week:  no     If yes:  na     Data:  Client is living at home with his mother and rated his living situation at a 1/10. He stated that being around his family has been very helpful, especially after the news story.     Progress: Client reports on a scale of 1-10, 1 being completely antagonistic and 10 being 100% supportive for recovery, his living situation is a 10. Client reported this was because \"being back around where I grew up and around friends and family who are all just glad I'm back in their lives\". Client reported he went to his fifide's family's house on her birthday and they all mourned her. Client reported he built his sober support network by \"working with 2 people whoa re new in recovery\". Client reported he plans to continue building his sober network this week by \"go to my 5 meetings\" and meet with his sponsor. Client reported he experienced multiple losses this week including his step grandfather , his step-mom's sister , and it was his late fiance's birthday. Client reported his father is also in the hospital.            Data:  Client is living at home with his mother and rated his living situation at a 1/10. He stated that being around his family has been very helpful, especially after the news story.     Progress: Client reports on a scale of 1-10, 1 being completely antagonistic and 10 being 100% supportive for recovery, his living situation is a 10. Client reported this was because \"being back around where I grew up and around friends and family who " "are all just glad I'm back in their lives\". Client reported he went to his fiance's family's house on her birthday and they all mourned her. Client reported he built his sober support network by \"working with 2 people whoa re new in recovery\". Client reported he plans to continue building his sober network this week by \"go to my 5 meetings\" and meet with his sponsor. Client reported he experienced multiple losses this week including his step grandfather , his step-mom's sister , and it was his late fiance's birthday. Client reported his father is also in the hospital. Client reported he continues to work his two full-time jobs, and reports that this keeps him busy. Client reported he remembers now that when he was younger before he began using substances, he was always balancing a full-time job, school, and sports that kept him extremely busy. Client reported he finds this to be important for him.     Intervention: Client heard words of emotional support and validation from his group members and group facilitator regarding the recent losses. Client participated in discussion about how different our lives are after becoming sober, and the difficulty at times letting go of the guilt and shame we feel about our past behaviors.     Assessment:   Stages of Change Model: Action  Client appears to be keeping himself busy in a healthy way, but is at risk of over-working, over-extending himself and experiencing burnout. Client appears to understand these risks and appears to be reflecting more on how to be busy but also be healthy.     Plan: Continue client in Phase 2.     MAG Esparza     "

## 2018-06-13 ENCOUNTER — HOSPITAL ENCOUNTER (OUTPATIENT)
Dept: BEHAVIORAL HEALTH | Facility: CLINIC | Age: 38
End: 2018-06-13
Attending: SOCIAL WORKER
Payer: COMMERCIAL

## 2018-06-13 PROCEDURE — H2035 A/D TX PROGRAM, PER HOUR: HCPCS | Mod: HQ

## 2018-06-13 NOTE — PROGRESS NOTES
Adult CD Treatment Plan Review/Weekly Progress Note      Treatment Plan Review completed on: 6/14/2018      Attendance Dates: Client attended Phase 3 (formerly Phase 2) group on Wednesday 6/13/2018 for a total of 2 hours.      Total Group Sessions in Phase I: 20  Total Group Sessions in Phase 2/Phase 3: 9 MONDAY TUESDAY WEDNESDAY THURSDAY FRIDAY SATURDAY SUNDAY TOTAL   Group Therapy                   Speciality Groups*                          1:1                      Family Program                          [place]                            Phase III         2                2   Absent                     Total        2         2      *Specialty Groups include Mental Health Care, Assertiveness and Communication, Sobriety Maintenance Skills,   Spiritual Care, Stress Management, Relapse Prevention, Family Systems.                     Learning Style:  Hands on,  Demonstration     Staff Members contributing: MAG Esparza     Received Supervision: no     Client: contributed to goals and plan     Did Client receive a copy of treatment plan/revised plan:  not yet     Changes to Treatment Plan:  none     Client agrees with plan/revised plan:  na     Any changes in Vulnerable Adult Status:  no    New Goals added since last review: none    Goals worked on since last review: Continue to develop sober support by reaching out to others     Strategies effective: Yes    Strategies need these changes: none     1) Care Coordination Activities:  None  2) Medical, Mental Health and other appointments the client attended: None reported  3) Medication issues: none  4) Physical and mental health problems: none  5) Review and evaluation of the individual abuse prevention plan: N/A    Substance Use Disorders:     F11.20      Opioid Use Disorder, severe     F10.20      Alcohol Use Disorder, moderate, in full remission     F14.10      Cocaine Use Disorder, mild, in full remission     F17.200    Tobacco Use Disorder,  moderate       ASAM Risk Ratings and Data         DIMENSION 1: Acute Intoxication/Withdrawal  The client's ability to cope with withdrawal symptoms and current state of intoxication      Acute Intoxication/Withdrawal - Current Risk Factor:  0     Reporting sober date of: 12/31/2017     Goals: Report to counselor any substance use not prescribed by a doctor.      Data: Client did not exhibit or report any signs or symptoms of intoxication or withdrawal.      DIMENSION 2:  Biomedical Conditions and Complaints  The degree to which any physical disorder would interfere with treatment for substance abuse and the client's ability to tolerate any related discomfort      Biomedical Conditions and Complaints - Current Risk Factor:  0     Data:  Client reported no physical biomedical issues of note.     DIMENSION 3:  Emotional/Behavioral/Cognitive Conditions and Complications  The degree to which any condition or complications are likely to interfere with treatment for substance abuse or with function in significant life areas and the likelihood of risk of harm to self or others.      Emotional/Behavioral - Current Risk Factor:  1     DSM-5 Diagnoses:  No diagnosable mental health issue. Client is dealing with grief and loss issues due to death of his girlfriend and bestfriend     Suicide Assessment:  Emergent?  No  Urgent / Non-Emergent?  No  Present / Non- Urgent?  No.    No Current Risk?  Yes, client reported no thoughts of self harm this week.     Goals:   1. Learn how to manage the symptoms of his anxiety along with his grief issues which include unpleasant/painful thoughts, feelings and sensation in a more helpful way using the ACT tools of mindfulness, acceptance and defusion in order to move his life forward.  2. Using the weekly Check-in worksheet, report severity of any mental health issues and the tools he used to cope with them.  3. Meet with therapist on a weekly basis    Data: Client denied suicidal ideation or  "self-injurious behavior. Client rated the following MH symptoms on a scale of 1-10 (0=did not endorse): sleeping more than usual- 3, fatigue- 5, difficulty concentrating- 0, restlessness- 0, mood swings- 0, irritability- 0, lack of interest or pleasure in activities- 0, hopelessness- 0, muscle tension- 0, shakiness- 0, sadness- 0, worry/rumination- 0, increased energy- 5, grandiosity- 3, sleeping less than usual- 5, appetite changes- 0, intrusive/distressing memories- 0, difficulty falling or staying asleep- 4.  Client reports that he coped with the aforementioned symptomology by \"self-awareness\" and \"daily meditation.\" Client reported he would like to further practice meditation and self-reflection. Client reported a mental health goal this week of: \"to honestly look at myself because I'm on step 4 with my sponsor\".       DIMENSION 4:  Readiness to Change  Consider the amount of support and encouragement necessary to keep the client involved in treatment.      Readiness to Change - Current Risk Factor: 0     Goals:    1. Increase internal motivation to stay sober by focusing and particular values week after week as noted in weekly Check-in and Recovery Action Plan (RAP) worksheet.  2. Define Values based on what he wants his life to be about.     Data: Client reported he moved his life forward this week by: \"had my first week alone because my entire family is out of town. My routine was the same but I was able to do it with myself\".      DIMENSION 5:  Relapse/Continued Use/Continued Problem Potential  Consider the degree to which the client recognizes relapse issues and has the skills to prevent relapse of either substance use or mental health problems.      Relapse/Continued Use/Continued Problem Potential - Current Risk Factor:  1     Goals:    1. Understand use and relapse patterns  2. Learn how to manage urges and cravings to use through the use of mindfulness and urge surfing techniques     Data: Client rated " "his cravings this week on a scale of 1-10 (1=none, 10= \"I used\"): 1. Client did not identify any triggers or coping skills needed.      DIMENSION 6:  Recovery Environment  Consider the degree to which key areas of the client's life are supportive of or antagonistic to treatment participation and recovery.      Recovery Environment - Current Risk Factor:  1     Support group meetings attended this week: 5    If zero attendance, what s preventing you: N/A.    Do you currently have a sponsor: Yes  Did you have contact with your sponsor this week? Yes    Did family agree to attend family week:  no     Data:  See DIAP below.   Employment: Client reports he is employed full-time.  Volunteerism: Yes (explain) - Volunteering within  community.           Data:  Client rated their living Situation on a scale of 1-10 (1=completely antagonistic 10=totally supportive): 10. Client reports that this is due to: \"I'm going to live with my father and step-mom because my dad will not be able to walk for 8 weeks, so I\"m going to be able to take care of him\". Client reports during the past week that he built their sober support network by \"I went out to dinner at my home group with everyone\". Client reported a plan to build their sober support network this week by \"got o my 5 meetings and keep meeting more and more sober people\". Client reported he and his step-mom had a \"heart to heart\" conversation recently and improved their relationship. Client reported he was \"surprised\" she was okay with him living with them because \"even 2 months ago\" she did not trust him due to his past using behavior. Client reported he and his step-mom will be going to [facility] in September 2018 and he will be supporting her in a race.     Intervention: Client was given group time to process the improvement of his relationship with his family over the past several months. Counselor utilized cognitive behavioral therapy, motivational interviewing " techniques. Counselor utilized validation, thought re-framing, and strengths-building techniques. Client met with counselor individually after group to discuss graduation. Client was given the number to inquire about [employer] alumni group.     Assessment: Client appears to be in the Stages of Change Model  Maintenance within the stages of change model. Dim 6 risk rating reduced to 1 due to client's reparation of his relationships with family along with his consistent engagement in sober support groups. Client appears nearing ready for discharge.     Plan: -Client will meet individually with counselor to further discuss plans for aftercare. Graduate client 6/20/18.     Codie Lopez, LISA [facility]

## 2018-06-20 ENCOUNTER — TRANSFERRED RECORDS (OUTPATIENT)
Dept: HEALTH INFORMATION MANAGEMENT | Facility: CLINIC | Age: 38
End: 2018-06-20

## 2018-06-20 ENCOUNTER — HOSPITAL ENCOUNTER (OUTPATIENT)
Dept: BEHAVIORAL HEALTH | Facility: CLINIC | Age: 38
End: 2018-06-20
Attending: SOCIAL WORKER
Payer: COMMERCIAL

## 2018-06-20 PROCEDURE — H2035 A/D TX PROGRAM, PER HOUR: HCPCS | Mod: HQ

## 2018-06-20 NOTE — PROGRESS NOTES
Adult CD Treatment Plan Review/Weekly Progress Note      Treatment Plan Review completed on: 6/21/18      Attendance Dates: Client attended Phase 3 (formerly Phase 2) group on Wednesday 6/20/2018 for a total of 2 hours.      Total Group Sessions in Phase I: 20  Total Group Sessions in Phase 2/Phase 3: 10       MONDAY TUESDAY WEDNESDAY THURSDAY FRIDAY SATURDAY ARNIE TOTAL   Group Therapy                   Speciality Groups*                          1:1                      Family Program                          [place]wan                            Phase III         2                2   Absent                     Total        2         2      *Specialty Groups include Mental Health Care, Assertiveness and Communication, Sobriety Maintenance Skills,   Spiritual Care, Stress Management, Relapse Prevention, Family Systems.                     Learning Style:  Hands on,  Demonstration     Staff Members contributing: MAG Esparza     Received Supervision: no     Client: contributed to goals and plan     Did Client receive a copy of treatment plan/revised plan:  not yet     Changes to Treatment Plan:  none     Client agrees with plan/revised plan:  na     Any changes in Vulnerable Adult Status:  no    New Goals added since last review: none    Goals worked on since last review: Continue to develop sober support by reaching out to others     Strategies effective: Yes    Strategies need these changes: none     1) Care Coordination Activities:  None  2) Medical, Mental Health and other appointments the client attended: None reported  3) Medication issues: none  4) Physical and mental health problems: none  5) Review and evaluation of the individual abuse prevention plan: N/A    Substance Use Disorders:     F11.20      Opioid Use Disorder, severe     F10.20      Alcohol Use Disorder, moderate, in full remission     F14.10      Cocaine Use Disorder, mild, in full remission     F17.200    Tobacco Use Disorder,  moderate       ASAM Risk Ratings and Data         DIMENSION 1: Acute Intoxication/Withdrawal  The client's ability to cope with withdrawal symptoms and current state of intoxication      Acute Intoxication/Withdrawal - Current Risk Factor:  0     Reporting sober date of: 12/31/2017     Goals: Report to counselor any substance use not prescribed by a doctor.      Data: Client did not exhibit or report any signs or symptoms of intoxication or withdrawal.      DIMENSION 2:  Biomedical Conditions and Complaints  The degree to which any physical disorder would interfere with treatment for substance abuse and the client's ability to tolerate any related discomfort      Biomedical Conditions and Complaints - Current Risk Factor:  0     Data:  Client reported no physical biomedical issues of note.     DIMENSION 3:  Emotional/Behavioral/Cognitive Conditions and Complications  The degree to which any condition or complications are likely to interfere with treatment for substance abuse or with function in significant life areas and the likelihood of risk of harm to self or others.      Emotional/Behavioral - Current Risk Factor:  1     DSM-5 Diagnoses:  No diagnosable mental health issue. Client is dealing with grief and loss issues due to death of his girlfriend and bestfriend     Suicide Assessment:  Emergent?  No  Urgent / Non-Emergent?  No  Present / Non- Urgent?  No.    No Current Risk?  Yes, client reported no thoughts of self harm this week.     Goals:   1. Learn how to manage the symptoms of his anxiety along with his grief issues which include unpleasant/painful thoughts, feelings and sensation in a more helpful way using the ACT tools of mindfulness, acceptance and defusion in order to move his life forward.  2. Using the weekly Check-in worksheet, report severity of any mental health issues and the tools he used to cope with them.  3. Meet with therapist on a weekly basis    Data: Client denied suicidal ideation or  "self-injurious behavior. Client rated the following MH symptoms on a scale of 1-10 (0=did not endorse): sleeping more than usual- 0, fatigue- 3, difficulty concentrating- 0, restlessness- 0, mood swings- 0, irritability- 0, lack of interest or pleasure in activities- 0, hopelessness- 0, muscle tension- 0, shakiness- 0, sadness- 0, worry/rumination- 0, increased energy- 5, grandiosity- 2, sleeping less than usual- 6, appetite changes- 3, intrusive/distressing memories- 0, difficulty falling or staying asleep- 5.  Client reports that he coped with the aforementioned symptomology by \"self-awareness for why they are happening.\" Client reported he wants to continue practicing \"meditation each morning for 10 minutes before I do anything\". Client reported a mental health goal this week of: \"continue doing what I am doing\".       DIMENSION 4:  Readiness to Change  Consider the amount of support and encouragement necessary to keep the client involved in treatment.      Readiness to Change - Current Risk Factor: 0     Goals:    1. Increase internal motivation to stay sober by focusing and particular values week after week as noted in weekly Check-in and Recovery Action Plan (RAP) worksheet.  2. Define Values based on what he wants his life to be about.     Data: Client reported he moved his life forward this week by: \"took a day off Sunday, my first day off in 3 months it was great and needed\".      DIMENSION 5:  Relapse/Continued Use/Continued Problem Potential  Consider the degree to which the client recognizes relapse issues and has the skills to prevent relapse of either substance use or mental health problems.      Relapse/Continued Use/Continued Problem Potential - Current Risk Factor:  1     Goals:    1. Understand use and relapse patterns  2. Learn how to manage urges and cravings to use through the use of mindfulness and urge surfing techniques     Data: Client rated his cravings this week on a scale of 1-10 (1=none, " "10= \"I used\"): 1. Client reported no triggers or coping skills used for cravings.     DIMENSION 6:  Recovery Environment  Consider the degree to which key areas of the client's life are supportive of or antagonistic to treatment participation and recovery.      Recovery Environment - Current Risk Factor:  1     Support group meetings attended this week: 5    If zero attendance, what s preventing you: N/A.    Do you currently have a sponsor: Yes  Did you have contact with your sponsor this week? Yes    Did family agree to attend family week:  no     Data: See DIAP below.            Data: Client rated their living Situation on a scale of 1-10 (1=completely antagonistic 10=totally supportive): 10. Client reports that this is due to: \"being able to take care of my father is great to be around family\". Client reports during the past week that he built their sober support network by \"I've been volunteering at my home group and it forces me to meet everyone\". Client reported a plan to build their sober support network this week by \"still attend my 5 meetings each week and grow on what I have\". Client reported he continues to work 2 jobs but had his \"first day off in 3 months\" and is working on building self-care into his schedule. Client shared that his 5 meetings are what he \"needs\" and they are \"what works for me\" and so he plans to continue his recovery in this way going forward, and hopes to inspire others to continue on in recovery.   Employment: Client reports he is employed full-time.  Volunteerism: Yes (explain) - Volunteering in AA.    Intervention: Client was given group time to process his past several months in recovery and his goals moving forward. Client participated in his graduation wherein each person shared something about him and/or for him. Counselor utilized cognitive behavioral therapy, motivational interviewing techniques. Counselor utilized validation, thought re-framing, and strengths-building " techniques.     Assessment: Client appears to be in the Stages of Change Model  Maintenance within the stages of change model. Client appears to have identified what is necessary for him in recovery and appears to be highly motivated to continue doing what is supportive of his recovery.     Plan: -Graduate client favorable discharge effective 6/20/18.     LISA Esparza [facility]

## 2018-06-20 NOTE — DISCHARGE SUMMARY
MICD Discharge Summary/Instructions     Patient: Christiano Ureña  MRN: [id number]   : 1980 Age: 37 year old Sex: male   -  Focus of Treatment / Discharge Recommendations    Personal Safety/ Management of Symptoms    * Follow your safety plan.  Report increased symptoms to your care team and /or go to the nearest Emergency Department.    * Call crisis lines as needed    [facility] [phone]                [facility] [phone]  [facility] [phone]              Crisis Connection [phone]  [facility] [phone]              [facility] COPE [phone]  [facility] [phone]          National Suicide Prevention [phone]  [facility] [phone]            Suicide Prevention [phone]  [facility] [phone]    Abstinence/Relapse Prevention  * Take all medicines as directed.  Carry a current list of medicines with you.  * Use coping skills: Talking with sponsor, attending meetings, distraction, praying, self-awareness, meditation.  * Do not use illicit (street) drugs, controlled substances (narcotics) or alcohol.    Develop/Improve Independent Living/Socialization Skills: Continue developing lifestyle balance between work, meetings, family obligations, and self-care.    Community Resources/Supports and Discharge Planning:    Follow up with psychiatrist / main caregiver: JACOB    Next visit: Call to schedule    Follow up with your therapist: JACOB   Next visit: Call to schedule    Go to group therapy and / or support groups at: AA/NA    Group Address Time Type   Beyond [facility]  [address]  ([id number])    12:00 PM Open Men and Women    Eight to Eighty [facility]  [address]  (Salley 1 Room [address])    06:00 PM Open Men and Women Step & Tradition   Higher [facility]  [address] [facility]  ([address])    07:00 PM Open Men and Women Step    Sunday Night Step & Speaker [facility]  [address] [place]  ([address])  Sunday  07:00 PM Open Men and Women Speaker Meeting   [facility]  [address]  ([address])  Sunday  07:00 PM Open Men and Women    Gratitude and Promises [facility]  [address] [place]  ([address])  Monday  07:00 AM Open Men and Women Big Book   [place] [employer] [employer]  [address] [address]  (No Parking in [employer] Lot)  Monday  07:00 AM Closed Men and Women Topic   Beyond [facility]  [address]  ([address])  Monday  12:00 PM Open Men and Women    Daily Reprieve [facility] Rm Downstairs East Wing  [address]  ([address])  Monday  12:00 PM Closed Men and Women Topic   [place] [employer]  [address] [address]  (No Parking in [employer] Lot)  Tuesday  07:00 AM Closed Men and Women Step   Gratitude and Promises [facility]  [address] [place]  ([address])  Tuesday  07:00 AM Open Men and Women Big Book   Beyond [facility]  [address] [facility]  ([address])  Tuesday  12:00 PM Open Men and Women    Bar [facility] 3rd flr. [facility]  [address]  ([address])  Tuesday  12:30 PM Closed Men and Women    Design For Living GLBT [facility] [place]  [address] [facility]  ([address])  Tuesday  07:00 PM Open Men and Women Big Book   Smith AA Group GLBT [facility] [place]  [address] [facility]  ([address])  Tuesday  07:15 PM Open Men and Women    Men's Problem and Solution Meeting [facility]  [address]  ([address])  Tuesday  07:30 PM Closed Men Discussion   How It Works [facility]  [address]  ([address])  Tuesday  08:00 PM Open Men and Women Big Book   Beyond [facility]  [address] [facility]  ([address])  Tuesday  08:00 PM Open Men and Women    Early Risers [facility]  [address]  ([address])  Wednesday  07:00 AM Closed  Men and Women Discussion   Willy Agu[employer]  [address] [address]  (No Parking in [employer] Lot)  Wednesday  07:00 AM Closed Men and Women Big Book   Gratitude and Promises [facility]  [address] [place]  ([address])  Wednesday  07:00 AM Open Men and Women Big Book   Beyond [facility]  [address]  ([address])  Wednesday  12:00 PM Open Men and Women    A [place] AA Group [facility] (Ed. Rm)  [address]  ([address])  Wednesday  12:00 PM Open Men and Women Step   Vets For Vets [facility]  [address]  ([address])  Wednesday  02:00 PM Open Men and Women Step   [place] [facility] [place] [facility]  [address]  ([address])  Wednesday  07:00 PM Closed Men and Women Topic   Wednesday Night [place] Big Book Group [facility]  [address] [facility] [address]  ([address])  Wednesday  07:30 PM Open Men and Women Big Book   Gratitude and Promises [facility]  [address] [place]  ([address])  Thursday  07:00 AM Open Men and Women Big Book   Willy [employer]  [address] [address]  (No Parking in [employer] Lot)  Thursday  07:00 AM Closed Men and Women Topic   Beyond [facility]  [address]  ([address])  Thursday  12:00 PM Open Men and Women    Open 12th Step Meeting [facility]  [address] [place]  ([address])  Thursday  12:00 PM Open Men and Women Step & Tradition   Bar None AA [place] 3rd flr. [place] [employer]  [address]  ([address])  Thursday  12:30 PM Closed Men and Women    Lasker Light [facility]  [address]  ([address])  Thursday  02:00 PM Open Men and Women    [facility] Group [facility]  [address]  ([address])  Thursday  06:15 PM Closed Men Discussion   Yaniv Luna [place] [place] Back Courtyard  [address] [facility]  ([address])  Thursday  07:00 PM Closed Men and Women Step & Tradition   Friends in [facility]  (in basement)  [address]  ([address])  Thursday  07:00 PM Open Men and Women Topic   [facility]  [address]  ([address])  Thursday  07:30 PM Open Men and Women Speaker Meeting   [place] Thursday Men's AA Group  Bldg  [address]  ([address])  Thursday  08:00 PM Open Men Discussion   Gratitude and Promises [facility]  [address] [place]  ([address])  Friday  07:00 AM Open Men and Women Big Book   GAP Gratitude & Promises [facility]  [address]  ([address])  Friday  07:00 AM Open Men and Women Step   Willy Aguilera [employer]  [address] [address]  (No Parking in Willy Samba [employer]. Lot)  Friday  07:00 AM Closed Men and Women Big Book   Salt & Peppers Senior Group [facility] [place]  [address] [facility]  ([address])  Friday  10:00 AM Open Men and Women Topic   Beyond [facility]  [address]  ([address])  Friday  12:00 PM Open Men and Women    Arun Women's AA Group GLBT [facility]  [address]  ([address])  Friday  06:15 PM Closed Women Step   G-Men AA [facility]  [address]  ([address])  Friday  06:30 PM Closed Men Big Book   GLBT Breakfast Club AA Group [facility]  [address]  ([address])  Saturday  10:30 AM Open Men and Women Step & Tradition   Beyond [facility]  [address] [facility]  ([address])  Saturday  12:00 PM Open Men and Women    Saturday Night Live Open Speakers [facility]-Me[facility] on 3rd Floor  [address] [place]  ([address])  Saturday  07:30 PM Open Men and Women Speaker Meeting     AA Referrals: [place]  Group Address Time Type   Squad 7 [facility]  [address]  ([address])  Sunday  10:00 AM Open Men and Women Big Book   Serenity Group [facility] Meeting Room  [address]  ([address])  Sunday  06:00 PM Open Men and Women Step & Tradition   Squad 17 [facility]  [address]  ([address])  Sunday  07:00 PM Open  Men and Women Step   Squad 8 [facility]  [address]  ([address])  Monday  06:30 PM Closed Men and Women    Squad 1 [facility]  [address]  ([address])  Monday  08:00 PM Closed Men and Women Step   Squad 6 [facility]  [address]  ([address])  Tuesday  07:00 PM Open Men and Women Big Book   Squad 5 [facility]  [address]  ([address])  Tuesday  07:30 PM Open Men and Women    Step Class [facility]  [address]  ([address])  Wednesday  07:00 PM Open Men and Women Step   General [facility]  [address]  ([address])  Wednesday  08:00 PM Open Men and Women Speaker Meeting   Happy & Sober AA Group [facility]  [address]  ([address])  Thursday  06:00 PM Closed Men and Women    Squad 12 [facility]  [address]  ([address])  Thursday  06:30 PM Closed Men and Women    Amberly Martinez AA [facility] [place]  [address]  ([address])  Thursday  07:00 PM Closed Men and Women    Squad 86 [facility]  [address]  (9[address])  Thursday  07:00 PM Closed Men    Squad 14 [facility]  [address]  ([address])  Thursday  07:30 PM Open Men and Women Discussion   Candle Light AA [facility]  [address]  ([address])  Thursday  08:00 PM Open Men and Women    Hit Squad-2 [facility]  [address]  ([address])  Friday  07:30 PM Closed Men and Women    Squad 11 [facility]  [address]  ([address])  Friday  07:30 PM Open Men and Women    Squad 10 [facility]  [address]  ([address])  Saturday  10:00 AM Open Women Big Book   Squad 24 [facility]  [address]  ([address])  Saturday  05:00 PM Closed Men and Women    Squad 9 [facility]  [address]  ([address])  Saturday  07:00 PM Open Men and Women Big Book       NA Meetings/Locations: [facility] Sunday 6:30 PM More  Will Be Revealed Hope [place] (behind [facility]) 53 [facility] Sunday 7:00 PM Amado Recovery NA [facility] Door 3811 [facility] Sunday 7:30 PM Pleasant NA [facility] [address] Monday 11:00 AM Journey of Recovery [facility] Basement 2608 [facility] Monday 11:00 AM Intro To The 12 Steps of NA [facility] [address] Building [address] Monday 5:00 PM New Beginning Text Study Iccm [facility] 1812 [facility] Monday 6:30 PM Warriors Too Recovery [facility] [address]   [place] Monday 7:00 PM NA By The Book [facility] [address] Monday 7:00 PM [facility] NA [facility] 1800 [facility] Monday 7:30 PM Disco Fishbowl [facility] 3100 [facility] Monday 7:30 PM Monday Night Wave [facility] 2504 [facility] Tuesday 11:00 AM Miracles in Motion [facility] - 1st Floor 2608 [facility] Tuesday 1:30 PM Recovery in the Daylight [facility] 1010 [facility] Tuesday 7:30 PM Clear Atmosphere [facility] 1st [facility] 2608 [facility] Tuesday 8:00 PM Fresh Start [facility] 27[address] [place] [address] Wednesday 11:30 AM The Masks Must Go Twenty Four [facility] 2400 [facility] Wednesday 1:00 PM Afternoon Serenity [facility] 2608 [facility] Wednesday 6:30 PM The Usual Suspects [place] Club [address] [facility] Wednesday 7:00 PM Get Honest and Grow [place]ative [place] 2020 [facility] (Bairon Hanna)   [place] Wednesday 7:30 PM Candlelight Bakery NA [facility] 27[address] [place] [address]  [facility] Wednesday 7:30 PM [place] NA [facility] [place] 2825 [facility] Wednesday 8:00 PM 7-11 Group [place] [facility]aker [facility] 3045 [facility] Wednesday 8:00 PM Just for Today NA Kaela [facility] Congregation [address] Thursday 11:00 AM Step Into Recovery [facility] 2608 [facility] Thursday 6:00 PM Amado FAJARDO.MAC Boyeran [facility]regation 4150 [facility] Thursday 7:00 PM Change of Vision NA [place] 2020 [facility] (Bairon & Jacques)   [place] Thursday 7:00 PM Saving Our Lives NA Celso Ivan [facility] [address] [place] [address] Thursday 7:30 PM Recovering Soldiers [facility] 1010 [facility] Thursday 7:30 PM Wishek JACOB [facility] [address] [place] [address] Friday 11:00 AM It Works on Friday Too [facility] (Basement[facility] 2608 [facility] Friday 6:30 PM Warriors In Recovery [facility] [address]   [place] Friday 7:00 PM Coast to Coast [facility] [address] Friday 7:00 PM GLBT & Friends Clarity [facility] 929 [facility] Friday 7:00 PM N.E.N.A.   [address]00 [address] [place] [address] Friday 7:00 PM  Women In Recovery [facility] 2501 [place] [facility] Friday 8:00 PM Addicts for Life [facility] 2450 [facility] Friday 8:00 PM Barking True Rod Congregation 2504 [facility] Friday 9:30 PM Young in Recovery Kaela Roxann Community [facility] [address] Friday 10:00 PM Hope at Night Hope [place] 53 [facility] Saturday 10:00 AM Recovering Ladies of [place] [facility] 1010 [facility]  Saturday 10:00 AM Stepping on Traditions JACOB Men's Text Study Group (Basement) 3501 [facility] Saturday 6:30 PM South Side Worda Kaela Word of [facility] [address] Saturday 7:00 PM No Matter What [place] 165 [facility] Saturday 7:00 PM Nuts about the Newcomer [facility] 2608 [facility] Saturday 7:30 PM GLBTQ & Friends [place] [facility] 420 [facility] Saturday 10:00 PM Late Night with JACOB Read [facility] [address] [place] [address]     See your medical doctor about:  Any medical concerns as needed    Other: Maintain contact with sponsor.    Client Signature:_______________________   Date / Time:___________      Staff Signature:________________________   Date / Time:___________

## 2018-06-25 NOTE — PROGRESS NOTES
Visit Date:   06/20/2018      CHEMICAL DEPENDENCY DISCHARGE SUMMARY      EVALUATION COUNSELOR:  JOYCE Ch, LIC.   TREATMENT COUNSELOR:  JOYCE Hernandez, LMFT, then [facility] Lopez [facility].   REFERRAL SOURCE:  [employer] [facility] and self.   PROGRAM:  [facility] Evening Outpatient Program.   ADMISSION DATE:  02/20/2018.   DISCHARGE DATE:  06/20/2018.   LAST SESSION DATE:  06/20/2018.      ADMISSION DIAGNOSES:   1.  Alcohol use disorder, moderate, F10.20, in full remission.   2.  Opioid use disorder, severe, F11.20.     3.  Cocaine use disorder, mild, F14.10 in full remission.   4.  Tobacco use disorder, moderate, F17.200.      DISCHARGE DIAGNOSES:   1.  Alcohol use disorder, moderate, F10.20, in full remission.   2.  Opioid use disorder, F11.20, in early remission.   3.  Cocaine use disorder, mild, F14.10 in early remission.   4.  Tobacco use disorder, moderate, F17.200.      DISCHARGE STATUS:  With staff approval.   LAST USE DATE:  01/08/2018.   HOURS OF TREATMENT COMPLETED:  54.      PRESENTING INFORMATION:  Client presented to the day outpatient program after graduating from [facility].  Client transitioned to the Evening Outpatient programming in phase III after his job began to interfere with his ability to attend the day outpatient program.  Client presented with a long history of heroin use and strong internal and external motivation to change after experiencing highly stressful events as a result of substances including losing his fiancee to overdose.      SERVICES PROVIDED:  The client participated in an assessment, diagnostic session, treatment planning session, counselor facilitated group therapy, and skill building groups.      ISSUES ADDRESSED IN TREATMENT:   DIMENSION 1:  Acute Intoxication and Withdrawal Potential:  Risk at admission 0.  Risk at discharge 0.  Client reported a last use date of 01/08/2018.  Client did not exhibit any signs or symptoms of intoxication or  withdrawal while in treatment.  Goal in this dimension was to develop effective strategies to maintain sobriety.  Outcome of this goal was effective.  Date completed: 06/20/2018.      DIMENSION 2:  Biomedical Conditions and Complaints:  Risk at admission 1.  Risk at discharge 0.  Client reported in chronic pain resulting from a back injury that he received while playing football.  Client reported that his condition would not interfere with treatment.  Client reported having consistent medical care with his provider, Dr. Mary Mcdonald, and a chiropractor.  The client did not report taking any medications while in the Evening Outpatient Program.  Client reported no biomedical concerns while in the Evening Outpatient Program.  Client manage any medical concerns effectively and autonomously.  Goal in this dimension was to follow the recommendations of medical providers.  Outcomes of this goal was effective.  Date completed: 06/20/2018.        DIMENSION 3:  Emotional, Behavioral, Cognitive Conditions and Complications:  Risk at admission 2.  Risk at discharge 1.  Upon admission, client reported depression and anxiety symptoms in part due to the recent death of his fiancee and best friend due to overdose as well as client's many years lost due to his addiction.  Client endorsed unresolved grief and loss, reporting that he felt numb.  Client worked to address his grief and loss while in the [facility] as well as in the day outpatient program, and reported upon graduation feeling that he had properly addressed his grief so that it would not be inhibiting him moving forward.  Client denied any suicidal ideation or self-injurious behaviors while in the program.  Goals in this dimension were to develop the ability to be more psychologically flexible using the tools of mindfulness and acceptance of diffusion when experiencing the pain of anxiety and depression.  The outcome of this goal was effective.  Date completed:  06/20/2018.  Client was open about his mental health concerns while in the program and practiced behaviors of self-awareness and meditation.        DIMENSION 4:  Readiness to Change:  Risk at admission 0.  Risk at discharge 0.  Client presented as highly motivated for sobriety.  Client was internally motivated to be sober, but also had external motivation of family pressure and significant recent losses due to substances including the loss of his fiancee and best friend to overdose.  Client initially was assessed as being in the contemplative stage of change within the stages of change model.  Upon graduation client was assessed as being in the maintenance stage of change within the stages of change model.  Goal in this dimension was to begin using values of the focus and moving his life forward and increase internal motivation to stay sober.  Outcome of this goal was effective.  Date completed: 06/20/2018.  Client maintained motivation while in treatment.  Client was accepting of recommendations and referrals.  Client reached out for support from his group members, his counselors, and his sponsor.       DIMENSION 5:  Relapse, Continued Use, Continued Problem Potential:  Risk at admission 3.  Risk at discharge 1.  Client reported 1 past treatment episode and limited periods of sobriety in his life.  Client presented to the day outpatient program as lacking relapse prevention and coping skills.  Client's recent stressors including losing his fiancee and best friend to overdose placed client at a high risk of relapse.  Goals in this dimension were to increase daily structure and increase awareness of cues to use and develop sober living skills.  Outcome of this goal was effective.  Date completed: 06/20/2018.  Client for several weeks checked in as experiencing no cravings.  Client had identified positive behaviors that were helpful for his sobriety including meditation, self-awareness, frequent meeting attendance,  "keeping a busy schedule with work, and engaging in altruistic behaviors through AA and in the community.  Client's risk of relapse assuming he continues all of the behaviors that he has been engaging in while in treatment is mild to moderate.        DIMENSION 6:  Recovery Environment:  Risk at admission 3.  Risk at discharge 1.  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  Client reported living with his mother upon admission, though client did begin living on his own while he was in treatment.  Client had sporadic sober support group involvement upon his admission in the past and did not have a sponsor.  Client reported no current legal involvement.  Client appeared to lack meaningful structured activities and a strong sober support network in his life.  Client reported strain with his family members as a result of his use, but reported they were supportive of his sobriety.  Goal in this dimension was to develop a sober support network.  Outcome of this goal was effective.  Date completed: 06/20/2018.  Client got a second job while in treatment and kept a busy schedule with 2 jobs.  The client did gain understanding of the importance of self-care and did work to implement self-care activities into his daily routine.  Client was living in his own house, but then reported that he would be living with his dad for the next couple of months to care for his dad after his dad had surgery.  Client will return back to the house that he was living in after he is done caring for his father.  Client engaged in volunteerism through AA.  Client did obtain a sponsor while in treatment and attended 5 meetings per week, identifying that that was what he \"needs\" and that they are \"what works for me.\"  Client endorsed a plan to continue these positive behaviors.  Client reported he improved his relationships with many family members who were hurt by his use.  Client " reported he remained close with his late farzaneh's family and while he no longer has his best friend and abe, he did report feeling that he had resolved his grief and loss concerns with these 2 losses and felt that they continued to motivate him.      STRENGTHS:  Client had high internal motivation for change.  Client was willing to accept help and guidance from others and demonstrated empathy toward his group members.      PROGNOSIS:  Favorable on the condition that client follow through with all continuing care recommendations.      LIVING ARRANGEMENTS AT DISCHARGE:  Client will be living with his father for the next couple of months to care for him after a surgery.  Client will then return to living in the home he was residing in throughout treatment.      CONTINUING CARE RECOMMENDATIONS:   1.  Abstain from the use of all mood-altering chemicals unless prescribed by a medical provider.   2.  Attend weekly sober support group meetings and maintain contact with sponsor.   3.  Consider joining [employer] alumni group.         This information has been disclosed to you from records protected by Federal confidentiality rules (42 CFR part 2). The Federal rules prohibit you from making any further disclosure of this information unless further disclosure is expressly permitted by the written consent of the person to whom it pertains or as otherwise permitted by 42 CFR part 2. A general authorization for the release of medical or other information is NOT sufficient for this purpose. The Federal rules restrict any use of the information to criminally investigate or prosecute any alcohol or drug abuse patient.      ISRAEL MENDIOLA CD             D: 2018   T: 2018   MT: DELROY      Name:     BASILIO ALSTON   MRN:      [id number]        Account:      [id number]   :      1980           Visit Date:   2018      Document: [id number]